# Patient Record
Sex: MALE | Race: WHITE
[De-identification: names, ages, dates, MRNs, and addresses within clinical notes are randomized per-mention and may not be internally consistent; named-entity substitution may affect disease eponyms.]

---

## 2019-07-08 ENCOUNTER — HOSPITAL ENCOUNTER (INPATIENT)
Dept: HOSPITAL 62 - ER | Age: 69
LOS: 4 days | Discharge: HOME HEALTH | DRG: 189 | End: 2019-07-12
Attending: STUDENT IN AN ORGANIZED HEALTH CARE EDUCATION/TRAINING PROGRAM | Admitting: STUDENT IN AN ORGANIZED HEALTH CARE EDUCATION/TRAINING PROGRAM
Payer: MEDICARE

## 2019-07-08 DIAGNOSIS — J44.1: ICD-10-CM

## 2019-07-08 DIAGNOSIS — J96.02: ICD-10-CM

## 2019-07-08 DIAGNOSIS — Z79.899: ICD-10-CM

## 2019-07-08 DIAGNOSIS — I50.811: ICD-10-CM

## 2019-07-08 DIAGNOSIS — J96.01: Primary | ICD-10-CM

## 2019-07-08 DIAGNOSIS — E87.5: ICD-10-CM

## 2019-07-08 DIAGNOSIS — Z99.81: ICD-10-CM

## 2019-07-08 DIAGNOSIS — F17.210: ICD-10-CM

## 2019-07-08 LAB
ABSOLUTE LYMPHOCYTES# (MANUAL): 0.5 10^3/UL (ref 0.5–4.7)
ABSOLUTE MONOCYTES # (MANUAL): 0.7 10^3/UL (ref 0.1–1.4)
ADD MANUAL DIFF: YES
ALBUMIN SERPL-MCNC: 3.6 G/DL (ref 3.5–5)
ALP SERPL-CCNC: 122 U/L (ref 38–126)
ALT SERPL-CCNC: 42 U/L (ref 21–72)
ANION GAP SERPL CALC-SCNC: 7 MMOL/L (ref 5–19)
ANISOCYTOSIS BLD QL SMEAR: SLIGHT
APTT BLD: 32.9 SEC (ref 23.5–35.8)
ARTERIAL BLOOD FIO2: (no result)
ARTERIAL BLOOD H2CO3: 2.34 MMOL/L (ref 1.05–1.35)
ARTERIAL BLOOD HCO3: 38.3 MMOL/L (ref 20–24)
ARTERIAL BLOOD PCO2: 77.9 MMHG (ref 35–45)
ARTERIAL BLOOD PH: 7.31 (ref 7.35–7.45)
ARTERIAL BLOOD PO2: 61.6 MMHG (ref 80–100)
ARTERIAL BLOOD TOTAL CO2: 40.7 MMOL/L (ref 23–27)
AST SERPL-CCNC: 31 U/L (ref 17–59)
BASE EXCESS BLDA CALC-SCNC: 9 MMOL/L
BASOPHILS NFR BLD MANUAL: 0 % (ref 0–2)
BILIRUB DIRECT SERPL-MCNC: 0.4 MG/DL (ref 0–0.4)
BILIRUB SERPL-MCNC: 0.6 MG/DL (ref 0.2–1.3)
BUN SERPL-MCNC: 24 MG/DL (ref 7–20)
CALCIUM: 8.8 MG/DL (ref 8.4–10.2)
CHLORIDE SERPL-SCNC: 96 MMOL/L (ref 98–107)
CK MB SERPL-MCNC: 3.35 NG/ML (ref ?–4.55)
CK SERPL-CCNC: 71 U/L (ref 55–170)
CO2 SERPL-SCNC: 39 MMOL/L (ref 22–30)
EOSINOPHIL NFR BLD MANUAL: 5 % (ref 0–6)
ERYTHROCYTE [DISTWIDTH] IN BLOOD BY AUTOMATED COUNT: 14.8 % (ref 11.5–14)
GLUCOSE SERPL-MCNC: 178 MG/DL (ref 75–110)
HCT VFR BLD CALC: 43.6 % (ref 37.9–51)
HGB BLD-MCNC: 14.3 G/DL (ref 13.5–17)
INR PPP: 1.1
LIPASE SERPL-CCNC: 20 U/L (ref 23–300)
MCH RBC QN AUTO: 31.2 PG (ref 27–33.4)
MCHC RBC AUTO-ENTMCNC: 32.9 G/DL (ref 32–36)
MCV RBC AUTO: 95 FL (ref 80–97)
MONOCYTES % (MANUAL): 5 % (ref 3–13)
PLATELET # BLD: 450 10^3/UL (ref 150–450)
PLATELET CLUMP BLD QL SMEAR: PRESENT
PLATELET COMMENT: ADEQUATE
POTASSIUM SERPL-SCNC: 5.6 MMOL/L (ref 3.6–5)
PROT SERPL-MCNC: 6.6 G/DL (ref 6.3–8.2)
PROTHROMBIN TIME: 14.2 SEC (ref 11.4–15.4)
RBC # BLD AUTO: 4.6 10^6/UL (ref 4.35–5.55)
SAO2 % BLDA: 88.3 % (ref 94–98)
SEGMENTED NEUTROPHILS % (MAN): 86 % (ref 42–78)
SODIUM SERPL-SCNC: 142 MMOL/L (ref 137–145)
STOMATOCYTES BLD QL SMEAR: SLIGHT
TOTAL CELLS COUNTED BLD: 100
TROPONIN I SERPL-MCNC: 0.26 NG/ML
VARIANT LYMPHS NFR BLD MANUAL: 4 % (ref 13–45)
WBC # BLD AUTO: 13.7 10^3/UL (ref 4–10.5)
WBC TOXIC VACUOLES BLD QL SMEAR: PRESENT

## 2019-07-08 PROCEDURE — 5A09457 ASSISTANCE WITH RESPIRATORY VENTILATION, 24-96 CONSECUTIVE HOURS, CONTINUOUS POSITIVE AIRWAY PRESSURE: ICD-10-PCS

## 2019-07-08 PROCEDURE — 71045 X-RAY EXAM CHEST 1 VIEW: CPT

## 2019-07-08 PROCEDURE — 80048 BASIC METABOLIC PNL TOTAL CA: CPT

## 2019-07-08 PROCEDURE — 93010 ELECTROCARDIOGRAM REPORT: CPT

## 2019-07-08 PROCEDURE — 94660 CPAP INITIATION&MGMT: CPT

## 2019-07-08 PROCEDURE — 87040 BLOOD CULTURE FOR BACTERIA: CPT

## 2019-07-08 PROCEDURE — 85025 COMPLETE CBC W/AUTO DIFF WBC: CPT

## 2019-07-08 PROCEDURE — 93306 TTE W/DOPPLER COMPLETE: CPT

## 2019-07-08 PROCEDURE — 82550 ASSAY OF CK (CPK): CPT

## 2019-07-08 PROCEDURE — 96374 THER/PROPH/DIAG INJ IV PUSH: CPT

## 2019-07-08 PROCEDURE — 93005 ELECTROCARDIOGRAM TRACING: CPT

## 2019-07-08 PROCEDURE — 96375 TX/PRO/DX INJ NEW DRUG ADDON: CPT

## 2019-07-08 PROCEDURE — 80053 COMPREHEN METABOLIC PANEL: CPT

## 2019-07-08 PROCEDURE — 85730 THROMBOPLASTIN TIME PARTIAL: CPT

## 2019-07-08 PROCEDURE — 99285 EMERGENCY DEPT VISIT HI MDM: CPT

## 2019-07-08 PROCEDURE — 84132 ASSAY OF SERUM POTASSIUM: CPT

## 2019-07-08 PROCEDURE — 84484 ASSAY OF TROPONIN QUANT: CPT

## 2019-07-08 PROCEDURE — 83690 ASSAY OF LIPASE: CPT

## 2019-07-08 PROCEDURE — 83605 ASSAY OF LACTIC ACID: CPT

## 2019-07-08 PROCEDURE — 82803 BLOOD GASES ANY COMBINATION: CPT

## 2019-07-08 PROCEDURE — 94640 AIRWAY INHALATION TREATMENT: CPT

## 2019-07-08 PROCEDURE — 85610 PROTHROMBIN TIME: CPT

## 2019-07-08 PROCEDURE — 82553 CREATINE MB FRACTION: CPT

## 2019-07-08 PROCEDURE — G0378 HOSPITAL OBSERVATION PER HR: HCPCS

## 2019-07-08 PROCEDURE — 96376 TX/PRO/DX INJ SAME DRUG ADON: CPT

## 2019-07-08 PROCEDURE — 36415 COLL VENOUS BLD VENIPUNCTURE: CPT

## 2019-07-08 PROCEDURE — 83880 ASSAY OF NATRIURETIC PEPTIDE: CPT

## 2019-07-08 RX ADMIN — HEPARIN SODIUM SCH UNIT: 5000 INJECTION, SOLUTION INTRAVENOUS; SUBCUTANEOUS at 22:33

## 2019-07-08 RX ADMIN — Medication SCH: at 22:50

## 2019-07-08 RX ADMIN — METHYLPREDNISOLONE SODIUM SUCCINATE SCH MG: 40 INJECTION, POWDER, FOR SOLUTION INTRAMUSCULAR; INTRAVENOUS at 22:35

## 2019-07-08 RX ADMIN — IPRATROPIUM BROMIDE AND ALBUTEROL SULFATE SCH ML: 2.5; .5 SOLUTION RESPIRATORY (INHALATION) at 20:40

## 2019-07-08 NOTE — ADVANCED CARE
- Diagnosis


(1) Acute respiratory failure with hypoxia and hypercapnia


Diagnosis Current: Yes   





(2) COPD exacerbation


Diagnosis Current: Yes   





(3) CHF (congestive heart failure)


Diagnosis Current: Yes   


Resuscitation Status: Do Not Resuscitate


Discussion: 


Discussed with patient and wife on bedside.  He verbalizes he has an advanced 

directive and that he is a DNR/DNI.  He does not want any chest compressions, 

defibrillation or mechanical ventilation if the need arises.  He says that his 

wife, Sarahi Ayala is his surrogate medical decision maker.

## 2019-07-08 NOTE — ER DOCUMENT REPORT
ED Respiratory Problem





- General


Chief Complaint: Shortness Of Breath


Stated Complaint: SHORT OF BREATH


Time Seen by Provider: 07/08/19 12:11


Mode of Arrival: Wheelchair


Information source: Patient, Relative


TRAVEL OUTSIDE OF THE [place] IN LAST 30 DAYS: No





- HPI


Patient complains to provider of: COPD, Short of breath - pt. with h/o COPD 

[place] continues to smoke with onset of SOB 

last night with exacerbation this am.





- Related Data


Allergies/Adverse Reactions: 


                                        





No Known Allergies Allergy (Verified 07/08/19 12:05)


   











Past Medical History





- General


Information source: Relative





- Social History


Smoking Status: Current Every Day Smoker


Cigarette use (# per day): Yes


Chew tobacco use (# tins/day): No


Smoking Education Provided: Yes


Family History: None





Review of Systems





- Review of Systems


Constitutional: No symptoms reported


EENT: No symptoms reported


Cardiovascular: No symptoms reported


Respiratory: See HPI, Short of breath


Gastrointestinal: No symptoms reported


Musculoskeletal: No symptoms reported


Neurological/Psychological: No symptoms reported


-: Yes All other systems reviewed and negative





Physical Exam





- Vital signs


Vitals: 


                                        











Temp Pulse Resp BP


 


 98.8 F   109 H  29 H  130/79 H


 


 07/08/19 12:23  07/08/19 12:23  07/08/19 12:23  07/08/19 12:23














- General


General appearance: Alert


In distress: Moderate





- HEENT


Mucous membranes: Normal


Pharynx: Normal


Neck: Normal





- Respiratory


Respiratory status: Respiratory distress - moderate


Chest status: Nontender


Breath sounds: Decreased air movement, Wheezing - bilateral end-expiratory 

wheezes audible





- Cardiovascular


Rhythm: Regular


Heart sounds: Normal auscultation


Murmur: No





- Extremities


General upper extremity: Normal inspection, Normal strength





- Neurological


Neuro grossly intact: Yes


Cognition: Normal


Orientation: AAOx4





Course





- Re-evaluation


Re-evalutation: 





07/08/19 15:32


Pt is breathing better after duonebs, lasix and steroids -- I will call the 

hospitalist for admission.





- Vital Signs


Vital signs: 


                                        











Temp Pulse Resp BP Pulse Ox


 


 98.8 F   109 H  19   130/79 H  94 


 


 07/08/19 12:23  07/08/19 12:23  07/08/19 13:20  07/08/19 12:23  07/08/19 13:20














- Laboratory


Result Diagrams: 


                                 07/08/19 12:15





                                 07/08/19 12:15


Laboratory results interpreted by me: 


                                        











  07/08/19 07/08/19 07/08/19





  12:15 12:15 12:15


 


WBC  13.7 H  


 


RDW  14.8 H  


 


Seg Neuts % (Manual)  86 H  


 


Lymphocytes % (Manual)  4 L  


 


Abs Neuts (Manual)  11.8 H  


 


Absolute Eos (Manual)  0.7 H  


 


Carbonic Acid   


 


ABG pH   


 


ABG pCO2   


 


ABG pO2   


 


ABG HCO3   


 


ABG Total CO2   


 


ABG O2 Saturation   


 


Potassium   5.6 H 


 


Chloride   96 L 


 


Carbon Dioxide   39 H 


 


BUN   24 H 


 


Glucose   178 H 


 


NT-Pro-B Natriuret Pep    3250 H


 


Lipase   20.0 L 














  07/08/19





  12:42


 


WBC 


 


RDW 


 


Seg Neuts % (Manual) 


 


Lymphocytes % (Manual) 


 


Abs Neuts (Manual) 


 


Absolute Eos (Manual) 


 


Carbonic Acid  2.34 H


 


ABG pH  7.31 L


 


ABG pCO2  77.9 H*


 


ABG pO2  61.6 L


 


ABG HCO3  38.3 H


 


ABG Total CO2  40.7 H


 


ABG O2 Saturation  88.3 L


 


Potassium 


 


Chloride 


 


Carbon Dioxide 


 


BUN 


 


Glucose 


 


NT-Pro-B Natriuret Pep 


 


Lipase 














- Diagnostic Test


Radiology reviewed: Reports reviewed - ?CHF





- EKG Interpretation by Me


EKG shows normal: Sinus rhythm


Rate: Normal


Rhythm: NSR - nsr without acute change





Critical Care Note





- Critical Care Note


Total time excluding time spent on procedures (mins): 30





Discharge





- Discharge


Clinical Impression: 


 COPD exacerbation





CHF (congestive heart failure)


Qualifiers:


 Heart failure type: unspecified Heart failure chronicity: unspecified Qualified

Code(s): I50.9 - Heart failure, unspecified





Condition: Stable


Disposition: ADMITTED AS OBSERVATION


Admitting Provider: Ursula (Hospitalist)


Unit Admitted: [facility]

## 2019-07-08 NOTE — RADIOLOGY REPORT (SQ)
EXAM DESCRIPTION:  CHEST SINGLE VIEW



COMPLETED DATE/TIME:  7/8/2019 12:28 pm



REASON FOR STUDY:  #1 SYNCOPE



COMPARISON:  None.



EXAM PARAMETERS:  NUMBER OF VIEWS: One view.

TECHNIQUE: Single frontal radiographic view of the chest acquired.

RADIATION DOSE: NA

LIMITATIONS: None.



FINDINGS:  LUNGS AND PLEURA: Few Kerley lines are present at both lung bases, question mild fluid ove
rload or congestive failure.

No pulmonary alveolar edema or dense consolidation worrisome for pneumonia.

No pleural effusions or pneumothorax.

MEDIASTINUM AND HILAR STRUCTURES: No masses.  Contour normal.

HEART AND VASCULAR STRUCTURES: Heart normal in size.  Normal vasculature.

BONES: No acute findings.

HARDWARE: None in the chest.

OTHER: No other significant finding.



IMPRESSION:  Few Kerley lines from fluid overload or congestive failure



TECHNICAL DOCUMENTATION:  JOB ID:  [id number]

 2011 [employer]- All Rights Reserved



Reading location - IP/workstation name: [email or web address]

## 2019-07-08 NOTE — EKG REPORT
SEVERITY:- OTHERWISE NORMAL ECG -

SINUS RHYTHM

BORDERLINE LEFT AXIS DEVIATION

:

Confirmed by: Cata Cristina MD 08-Jul-2019 19:20:28

## 2019-07-08 NOTE — ER DOCUMENT REPORT
ED Medical Screen (RME)





- General


Chief Complaint: Shortness Of Breath


Stated Complaint: SHORT OF BREATH


Time Seen by Provider: 07/08/19 12:11


Mode of Arrival: Wheelchair


Information source: Patient


Notes: 





69-year-old male presents to ED for shortness of breath unable to get his 

breath.  He states his been since last night his wife states is been a lot 

longer than that.  He does smoke more than a pack a day.  Patient is very short 

of breath with sats in the 70s and 80s we did get it up to 82 on 2 L when we 

moved him to the bed 3 we did get up to 92.  I have started labs x-rays 

treatments steroids and informed Dr. Mojica the patient needed to be seen 

promptly.














I have greeted and performed a rapid initial assessment of this patient.  A 

comprehensive ED assessment and evaluation of the patient, analysis of test 

results and completion of medical decision making process will be conducted by 

an additional ED providers.








Dictation of this chart was performed using voice recognition software; 

therefore, there may be some unintended grammatical errors.


TRAVEL OUTSIDE OF THE [place] IN LAST 30 DAYS: No





- Related Data


Allergies/Adverse Reactions: 


                                        





No Known Allergies Allergy (Verified 07/08/19 12:05)

## 2019-07-08 NOTE — PDOC H&P
History of Present Illness


Admission Date/PCP: 


  07/08/19 15:52





  





Patient complains of: SOB


History of Present Illness: 


KALA VIERA is a 69 year old male who denies significant past medical history 

but does not see a regular physician who is presenting with worsening shortness 

of breath and cough.





Wife is in the bedside.  Patient is a chronic heavy smoker.  He was not 

previously diagnosed with COPD but she says that they have been suspecting COPD 

as he has been having shortness of breath for the past 8 months and has 

occasional wheezing.  He says that he has been having increasingly productive 

cough for the past week with whitish sputum.  He started developing worsening 

shortness of breath and fast 45 days.  He says that his wife checked his O2 

saturation this morning it was down to 49%.  In the ER, patient was noted to be 

hypoxic in the 70s.  He was noted to have significant bilateral wheezing.  He 

was given Solu-Medrol and breathing treatments.  His chest x-ray also showed 

pulmonary congestion.  He was also given IV Lasix in the ER.





Upon encounter, patient is saturating well on the BiPAP.  He says he is 

breathing is much better.  He does have rhonchi and wheezes in both sides.  He 

denies a prior diagnosis of COPD or CHF.








Social History


Smoking Status: Current Every Day Smoker





Family History


Family History: None


Parental Family History Reviewed: Yes - no premature CAD


Children Family History Reviewed: No


Sibling(s) Family History Reviewed.: No





Medication/Allergy


Allergies/Adverse Reactions: 


                                        





No Known Allergies Allergy (Verified 07/08/19 12:05)


   











Review of Systems


All systems: reviewed and no additional remarkable complaints except as stated -

as mentioned in HPI





Physical Exam


Vital Signs: 


                                        











Temp Pulse Resp BP Pulse Ox


 


 98.8 F   109 H  19   130/79 H  94 


 


 07/08/19 12:23  07/08/19 12:23  07/08/19 13:20  07/08/19 12:23  07/08/19 13:20








                                 Intake & Output











 07/07/19 07/08/19 07/09/19





 06:59 06:59 06:59


 


Weight   177 lb 0.499 oz











General appearance: PRESENT: no acute distress, well-developed, well-nourished


Head exam: PRESENT: atraumatic, normocephalic


Eye exam: PRESENT: conjunctiva pink, EOMI, PERRLA.  ABSENT: scleral icterus


Ear exam: PRESENT: normal external ear exam


Mouth exam: PRESENT: moist, tongue midline


Neck exam: ABSENT: carotid bruit, JVD, lymphadenopathy, thyromegaly


Respiratory exam: PRESENT: rhonchi, wheezes.  ABSENT: rales


Cardiovascular exam: PRESENT: RRR.  ABSENT: diastolic murmur, rubs, systolic 

murmur


Pulses: PRESENT: normal dorsalis pedis pul


GI/Abdominal exam: PRESENT: normal bowel sounds, soft.  ABSENT: distended, 

guarding, mass, organolmegaly, rebound, tenderness


Rectal exam: PRESENT: deferred


Neurological exam: PRESENT: alert, awake, oriented to person, oriented to place,

oriented to time, oriented to situation, CN II-XII grossly intact.  ABSENT: 

motor sensory deficit





Results


Laboratory Results: 


                                        





                                 07/08/19 12:15 





                                 07/08/19 12:15 





                                        











  07/08/19 07/08/19 07/08/19





  12:15 12:15 12:15


 


WBC  13.7 H  


 


RBC  4.60  


 


Hgb  14.3  


 


Hct  43.6  


 


MCV  95  


 


MCH  31.2  


 


MCHC  32.9  


 


RDW  14.8 H  


 


Plt Count  450  


 


Seg Neutrophils %  Not Reportable  


 


Lymphocytes %  Not Reportable  


 


Monocytes %  Not Reportable  


 


Eosinophils %  Not Reportable  


 


Basophils %  Not Reportable  


 


Absolute Neutrophils  Not Reportable  


 


Absolute Lymphocytes  Not Reportable  


 


Absolute Monocytes  Not Reportable  


 


Absolute Eosinophils  Not Reportable  


 


Absolute Basophils  Not Reportable  


 


Carbonic Acid   


 


HCO3/H2CO3 Ratio   


 


ABG pH   


 


ABG pCO2   


 


ABG pO2   


 


ABG HCO3   


 


ABG O2 Saturation   


 


ABG Base Excess   


 


FiO2   


 


Sodium   142.0 


 


Potassium   5.6 H 


 


Chloride   96 L 


 


Carbon Dioxide   39 H 


 


Anion Gap   7 


 


BUN   24 H 


 


Creatinine   0.86 


 


Est GFR ( Amer)   > 60 


 


Est GFR (Non-Af Amer)   > 60 


 


Glucose   178 H 


 


Lactic Acid    1.2


 


Calcium   8.8 


 


Total Bilirubin   0.6 


 


AST   31 


 


ALT   42 


 


Alkaline Phosphatase   122 


 


Total Protein   6.6 


 


Albumin   3.6 


 


Lipase   20.0 L 














  07/08/19





  12:42


 


WBC 


 


RBC 


 


Hgb 


 


Hct 


 


MCV 


 


MCH 


 


MCHC 


 


RDW 


 


Plt Count 


 


Seg Neutrophils % 


 


Lymphocytes % 


 


Monocytes % 


 


Eosinophils % 


 


Basophils % 


 


Absolute Neutrophils 


 


Absolute Lymphocytes 


 


Absolute Monocytes 


 


Absolute Eosinophils 


 


Absolute Basophils 


 


Carbonic Acid  2.34 H


 


HCO3/H2CO3 Ratio  16:1


 


ABG pH  7.31 L


 


ABG pCO2  77.9 H*


 


ABG pO2  61.6 L


 


ABG HCO3  38.3 H


 


ABG O2 Saturation  88.3 L


 


ABG Base Excess  9.0


 


FiO2  2L


 


Sodium 


 


Potassium 


 


Chloride 


 


Carbon Dioxide 


 


Anion Gap 


 


BUN 


 


Creatinine 


 


Est GFR ( Amer) 


 


Est GFR (Non-Af Amer) 


 


Glucose 


 


Lactic Acid 


 


Calcium 


 


Total Bilirubin 


 


AST 


 


ALT 


 


Alkaline Phosphatase 


 


Total Protein 


 


Albumin 


 


Lipase 








                                        











  07/08/19 07/08/19 07/08/19





  12:15 12:15 12:15


 


Creatine Kinase  71  


 


CK-MB (CK-2)   3.35 


 


Troponin I   0.255 


 


NT-Pro-B Natriuret Pep    3250 H











Impressions: 


                                        





Chest X-Ray  07/08/19 12:11


IMPRESSION:  Few Kerley lines from fluid overload or congestive failure


 














Assessment and Plan





- Diagnosis


(1) Acute respiratory failure with hypoxia and hypercapnia


Is this a current diagnosis for this admission?: Yes   





(2) COPD exacerbation


Is this a current diagnosis for this admission?: Yes   





(3) CHF (congestive heart failure)


Qualifiers: 


   Heart failure type: unspecified   Heart failure chronicity: unspecified   

Qualified Code(s): I50.9 - Heart failure, unspecified   


Is this a current diagnosis for this admission?: Yes   





- Time


Time Spent with patient: 25-34 minutes

## 2019-07-09 RX ADMIN — METHYLPREDNISOLONE SODIUM SUCCINATE SCH MG: 40 INJECTION, POWDER, FOR SOLUTION INTRAMUSCULAR; INTRAVENOUS at 13:16

## 2019-07-09 RX ADMIN — IPRATROPIUM BROMIDE AND ALBUTEROL SULFATE SCH ML: 2.5; .5 SOLUTION RESPIRATORY (INHALATION) at 16:35

## 2019-07-09 RX ADMIN — IPRATROPIUM BROMIDE AND ALBUTEROL SULFATE SCH ML: 2.5; .5 SOLUTION RESPIRATORY (INHALATION) at 08:35

## 2019-07-09 RX ADMIN — HEPARIN SODIUM SCH UNIT: 5000 INJECTION, SOLUTION INTRAVENOUS; SUBCUTANEOUS at 13:16

## 2019-07-09 RX ADMIN — HEPARIN SODIUM SCH UNIT: 5000 INJECTION, SOLUTION INTRAVENOUS; SUBCUTANEOUS at 21:25

## 2019-07-09 RX ADMIN — Medication SCH ML: at 05:15

## 2019-07-09 RX ADMIN — AZITHROMYCIN SCH MG: 250 TABLET, FILM COATED ORAL at 09:13

## 2019-07-09 RX ADMIN — IPRATROPIUM BROMIDE AND ALBUTEROL SULFATE SCH ML: 2.5; .5 SOLUTION RESPIRATORY (INHALATION) at 20:26

## 2019-07-09 RX ADMIN — METHYLPREDNISOLONE SODIUM SUCCINATE SCH MG: 40 INJECTION, POWDER, FOR SOLUTION INTRAMUSCULAR; INTRAVENOUS at 21:25

## 2019-07-09 RX ADMIN — HEPARIN SODIUM SCH UNIT: 5000 INJECTION, SOLUTION INTRAVENOUS; SUBCUTANEOUS at 05:15

## 2019-07-09 RX ADMIN — FUROSEMIDE SCH MG: 20 TABLET ORAL at 09:13

## 2019-07-09 RX ADMIN — IPRATROPIUM BROMIDE AND ALBUTEROL SULFATE SCH ML: 2.5; .5 SOLUTION RESPIRATORY (INHALATION) at 00:04

## 2019-07-09 RX ADMIN — Medication SCH ML: at 13:16

## 2019-07-09 RX ADMIN — Medication SCH ML: at 21:25

## 2019-07-09 RX ADMIN — METHYLPREDNISOLONE SODIUM SUCCINATE SCH MG: 40 INJECTION, POWDER, FOR SOLUTION INTRAMUSCULAR; INTRAVENOUS at 05:14

## 2019-07-09 RX ADMIN — IPRATROPIUM BROMIDE AND ALBUTEROL SULFATE SCH ML: 2.5; .5 SOLUTION RESPIRATORY (INHALATION) at 12:46

## 2019-07-09 RX ADMIN — IPRATROPIUM BROMIDE AND ALBUTEROL SULFATE SCH ML: 2.5; .5 SOLUTION RESPIRATORY (INHALATION) at 04:10

## 2019-07-09 NOTE — PDOC PROGRESS REPORT
Subjective


Progress Note for:: 07/09/19


Subjective:: 





Mr. Ayala is a very pleasant 69-year-old gentleman who presented to the ER 

yesterday with a COPD exacerbation and acute congestive heart failure unknown 

type for which we are awaiting echocardiogram at this time.  Patient was found 

to have a BNP of 3000 yesterday.  He was placed on BiPAP and started on Lasix, 

steroids, nebulizers and antibiotics.  At this time patient states his breathing

is much better he is in no apparent distress and complaining no concerns at this

time.  Patient was also hyperkalemic in the ER and I am awaiting a follow-up 

potassium level at this time.


Reason For Visit: 


COPD EXACERBATION,CHF (CONGESTIVE HEART FAILURE)








Physical Exam


Vital Signs: 


                                        











Temp Pulse Resp BP Pulse Ox


 


 97.8 F   89   20   124/59 L  95 


 


 07/09/19 12:00  07/09/19 14:00  07/09/19 12:46  07/09/19 12:00  07/09/19 12:46








                                 Intake & Output











 07/08/19 07/09/19 07/10/19





 06:59 06:59 06:59


 


Intake Total  120 240


 


Balance  120 240


 


Weight  75.8 kg 











General appearance: PRESENT: no acute distress, well-developed, well-nourished


Neck exam: ABSENT: carotid bruit, JVD, lymphadenopathy, thyromegaly


Respiratory exam: PRESENT: rales


Cardiovascular exam: PRESENT: RRR.  ABSENT: diastolic murmur, rubs, systolic 

murmur


Pulses: PRESENT: normal dorsalis pedis pul


Vascular exam: PRESENT: normal capillary refill


GI/Abdominal exam: PRESENT: normal bowel sounds, soft.  ABSENT: distended, 

guarding, mass, organolmegaly, rebound, tenderness


Neurological exam: PRESENT: alert, awake, oriented to person, oriented to place,

oriented to time, oriented to situation, CN II-XII grossly intact.  ABSENT: 

motor sensory deficit


Psychiatric exam: PRESENT: appropriate affect, normal mood.  ABSENT: homicidal 

ideation, suicidal ideation


Skin exam: PRESENT: dry, intact, warm.  ABSENT: cyanosis, rash





Results


Laboratory Results: 


                                        





                                 07/08/19 12:15 





                                 07/08/19 12:15 





                                        











  07/08/19 07/08/19 07/08/19





  12:15 12:15 12:15


 


Creatine Kinase  71  


 


CK-MB (CK-2)   3.35 


 


Troponin I   0.255 


 


NT-Pro-B Natriuret Pep    3250 H











Impressions: 


                                        





Chest X-Ray  07/08/19 12:11


IMPRESSION:  Few Kerley lines from fluid overload or congestive failure


 














Assessment and Plan





- Diagnosis


(1) Acute respiratory failure with hypoxia and hypercapnia


Is this a current diagnosis for this admission?: Yes   


Plan: 


Improved.  At this time continue BiPAP.  Submental oxygen as needed.  Will fol

low make adjustments based on patient needs.








(2) CHF (congestive heart failure)


Qualifiers: 


   Heart failure type: unspecified   Heart failure chronicity: unspecified   

Qualified Code(s): I50.9 - Heart failure, unspecified   


Is this a current diagnosis for this admission?: Yes   


Plan: 


Continue current Lasix dosing.  Repeat BMP in the a.m.  Patient in no distress 

at this time.








(3) COPD exacerbation


Is this a current diagnosis for this admission?: Yes   


Plan: 


Improved.  Continue Solu-Medrol, nebs and antibiotics at this time.  Will most 

likely switch IV antibodies to p.o. in the a.m.








(4) Hyperkalemia


Is this a current diagnosis for this admission?: Yes   


Plan: 


Repeat potassium level now.  Treat as appropriate.








- Time


Time Spent with patient: 15-24 minutes





- Inpatient Certification


Medical Necessity: Need for Nebulizer Therapy and Monitoring of Response, Risk 

of Complication if Not Cared For in Hospital

## 2019-07-10 LAB
ABSOLUTE LYMPHOCYTES# (MANUAL): 1.3 10^3/UL (ref 0.5–4.7)
ABSOLUTE MONOCYTES # (MANUAL): 1.2 10^3/UL (ref 0.1–1.4)
ADD MANUAL DIFF: YES
ANION GAP SERPL CALC-SCNC: 5 MMOL/L (ref 5–19)
BASOPHILS NFR BLD MANUAL: 0 % (ref 0–2)
BUN SERPL-MCNC: 24 MG/DL (ref 7–20)
CALCIUM: 8.3 MG/DL (ref 8.4–10.2)
CHLORIDE SERPL-SCNC: 96 MMOL/L (ref 98–107)
CO2 SERPL-SCNC: 38 MMOL/L (ref 22–30)
EOSINOPHIL NFR BLD MANUAL: 0 % (ref 0–6)
ERYTHROCYTE [DISTWIDTH] IN BLOOD BY AUTOMATED COUNT: 14.5 % (ref 11.5–14)
GLUCOSE SERPL-MCNC: 202 MG/DL (ref 75–110)
HCT VFR BLD CALC: 39.8 % (ref 37.9–51)
HGB BLD-MCNC: 13.1 G/DL (ref 13.5–17)
MCH RBC QN AUTO: 30.9 PG (ref 27–33.4)
MCHC RBC AUTO-ENTMCNC: 32.8 G/DL (ref 32–36)
MCV RBC AUTO: 94 FL (ref 80–97)
MONOCYTES % (MANUAL): 9 % (ref 3–13)
NEUTS BAND NFR BLD MANUAL: 4 % (ref 3–5)
PLATELET # BLD: 397 10^3/UL (ref 150–450)
PLATELET COMMENT: ADEQUATE
POTASSIUM SERPL-SCNC: 5.3 MMOL/L (ref 3.6–5)
RBC # BLD AUTO: 4.23 10^6/UL (ref 4.35–5.55)
RBC MORPH BLD: (no result)
SEGMENTED NEUTROPHILS % (MAN): 77 % (ref 42–78)
SODIUM SERPL-SCNC: 139.2 MMOL/L (ref 137–145)
TOTAL CELLS COUNTED BLD: 100
VARIANT LYMPHS NFR BLD MANUAL: 10 % (ref 13–45)
WBC # BLD AUTO: 12.9 10^3/UL (ref 4–10.5)

## 2019-07-10 RX ADMIN — IPRATROPIUM BROMIDE AND ALBUTEROL SULFATE SCH ML: 2.5; .5 SOLUTION RESPIRATORY (INHALATION) at 00:14

## 2019-07-10 RX ADMIN — IPRATROPIUM BROMIDE AND ALBUTEROL SULFATE SCH ML: 2.5; .5 SOLUTION RESPIRATORY (INHALATION) at 20:01

## 2019-07-10 RX ADMIN — IPRATROPIUM BROMIDE AND ALBUTEROL SULFATE SCH ML: 2.5; .5 SOLUTION RESPIRATORY (INHALATION) at 08:08

## 2019-07-10 RX ADMIN — Medication SCH ML: at 22:26

## 2019-07-10 RX ADMIN — Medication SCH ML: at 05:46

## 2019-07-10 RX ADMIN — PREDNISONE SCH MG: 20 TABLET ORAL at 09:41

## 2019-07-10 RX ADMIN — AZITHROMYCIN SCH MG: 250 TABLET, FILM COATED ORAL at 09:42

## 2019-07-10 RX ADMIN — IPRATROPIUM BROMIDE AND ALBUTEROL SULFATE SCH ML: 2.5; .5 SOLUTION RESPIRATORY (INHALATION) at 11:25

## 2019-07-10 RX ADMIN — HEPARIN SODIUM SCH UNIT: 5000 INJECTION, SOLUTION INTRAVENOUS; SUBCUTANEOUS at 22:26

## 2019-07-10 RX ADMIN — HEPARIN SODIUM SCH UNIT: 5000 INJECTION, SOLUTION INTRAVENOUS; SUBCUTANEOUS at 16:00

## 2019-07-10 RX ADMIN — METHYLPREDNISOLONE SODIUM SUCCINATE SCH MG: 40 INJECTION, POWDER, FOR SOLUTION INTRAMUSCULAR; INTRAVENOUS at 05:46

## 2019-07-10 RX ADMIN — HEPARIN SODIUM SCH UNIT: 5000 INJECTION, SOLUTION INTRAVENOUS; SUBCUTANEOUS at 05:46

## 2019-07-10 RX ADMIN — Medication SCH ML: at 16:00

## 2019-07-10 RX ADMIN — IPRATROPIUM BROMIDE AND ALBUTEROL SULFATE SCH ML: 2.5; .5 SOLUTION RESPIRATORY (INHALATION) at 04:41

## 2019-07-10 RX ADMIN — IPRATROPIUM BROMIDE AND ALBUTEROL SULFATE SCH ML: 2.5; .5 SOLUTION RESPIRATORY (INHALATION) at 16:19

## 2019-07-10 NOTE — PDOC PROGRESS REPORT
Subjective


Progress Note for:: 07/10/19


Subjective:: 


Mr. Ayala is a pleasant 69-year-old gentleman who presented to ER with acute





Reason For Visit: 


ACUTE RESPIRATORY FAILURE ,POSSIBLE COPD VS CHF








Physical Exam


Vital Signs: 


                                        











Temp Pulse Resp BP Pulse Ox


 


 97.6 F   84   16   121/68   90 L


 


 07/10/19 03:40  07/10/19 08:08  07/10/19 08:08  07/10/19 03:40  07/10/19 08:08








                                 Intake & Output











 07/09/19 07/10/19 07/11/19





 06:59 06:59 06:59


 


Intake Total 120 1787 


 


Output Total  850 


 


Balance 120 937 


 


Weight 75.8 kg 76.3 kg 














Results


Laboratory Results: 


                                        





                                 07/10/19 05:49 





                                 07/10/19 05:49 





                                        











  07/09/19 07/10/19 07/10/19





  17:39 05:49 05:49


 


WBC   12.9 H 


 


RBC   4.23 L 


 


Hgb   13.1 L 


 


Hct   39.8 


 


MCV   94 


 


MCH   30.9 


 


MCHC   32.8 


 


RDW   14.5 H 


 


Plt Count   397 


 


Seg Neutrophils %   Not Reportable 


 


Lymphocytes %   Not Reportable 


 


Monocytes %   Not Reportable 


 


Eosinophils %   Not Reportable 


 


Basophils %   Not Reportable 


 


Absolute Neutrophils   Not Reportable 


 


Absolute Lymphocytes   Not Reportable 


 


Absolute Monocytes   Not Reportable 


 


Absolute Eosinophils   Not Reportable 


 


Absolute Basophils   Not Reportable 


 


Sodium    139.2


 


Potassium  5.0   5.3 H


 


Chloride    96 L


 


Carbon Dioxide    38 H


 


Anion Gap    5


 


BUN    24 H


 


Creatinine    0.66


 


Est GFR ( Amer)    > 60


 


Est GFR (Non-Af Amer)    > 60


 


Glucose    202 H


 


Calcium    8.3 L








                                        











  07/08/19 07/08/19 07/08/19





  12:15 12:15 12:15


 


Creatine Kinase  71  


 


CK-MB (CK-2)   3.35 


 


Troponin I   0.255 


 


NT-Pro-B Natriuret Pep    3250 H














  07/10/19





  05:49


 


Creatine Kinase 


 


CK-MB (CK-2) 


 


Troponin I 


 


NT-Pro-B Natriuret Pep  926 H











Impressions: 


                                        





Chest X-Ray  07/08/19 12:11


IMPRESSION:  Few Kerley lines from fluid overload or congestive failure


 














Assessment and Plan





- Diagnosis


(1) Acute respiratory failure with hypoxia and hypercapnia


Is this a current diagnosis for this admission?: Yes   


Plan: 


Improved.  At this time continue BiPAP.  Submental oxygen as needed.  Will 

follow make adjustments based on patient needs.





7/10/2019-clinically improved continues to refuse to wear BiPAP.  Submental 

oxygen as needed.  Patient did desat this a.m. while shaving without his oxygen 

to 80%.  I suspect this most likely secondary to his acute congestive heart 

failure of unknown type at this time.  Will diurese further and reassess.








(2) CHF (congestive heart failure)


Qualifiers: 


   Heart failure type: unspecified   Heart failure chronicity: unspecified   

Qualified Code(s): I50.9 - Heart failure, unspecified   


Is this a current diagnosis for this admission?: Yes   


Plan: 


Continue current Lasix dosing.  Repeat BMP in the a.m.  Patient in no distress a

t this time.





7/10/2019-hold a.m. p.o. Lasix give Lasix 40 mill grams IV x1.  Will reassess in

the a.m.








(3) COPD exacerbation


Is this a current diagnosis for this admission?: Yes   


Plan: 


Improved.  Continue Solu-Medrol, nebs and antibiotics at this time.  Will most 

likely switch IV antibodies to p.o. in the a.m.





7/10/2019-improved.  Wheezing is much improved at this time.  Will switch IV 

Solu-Medrol to p.o. prednisone 60 mg daily at this time.








(4) Hyperkalemia


Is this a current diagnosis for this admission?: Yes   


Plan: 


Repeat potassium level now.  Treat as appropriate.





7/10/2019-yesterday repeat potassium level was 5.0.  Today potassium level 5.3. 

IV Lasix will be given as a one-time dose repeat BMP in the a.m.








- Time


Time Spent with patient: 15-24 minutes





- Inpatient Certification


Medical Necessity: Other - Continues to require O2 therapy as O2 sats drop to 

80% while shaving without oxygen.  Patient also requiring IV Lasix for diuresis.

## 2019-07-10 NOTE — PROGRESS NOTE ACKNOWLEDGEMENT
Progress Note Acknowledgement


Progess Note Acknowledgement: 


I, the undersigned member of the medical staff with appropriate privileges and 

with supervisory authority over Fly Jones, a [facility] practice allied health 

professional, acknowledge that I have reviewed the progress notes entered on 

this patient, and in my professional judgment believe that the assessment made 

and/or any care evidenced was appropriate

## 2019-07-10 NOTE — XCELERA REPORT
[address] [address]

                               Tel: [phone]

                               Fax: [phone]



                      Transthoracic Echocardiogram Report

_______________________________________________________________________________



Name: KALA VIERA

MRN: [id number]                           Age: 69 yrs

Gender: Male                              : 1950

Patient Status: Inpatient                 Patient Location: [address] [place] [address]

Account #: [id number]

Study Date: 2019 06:20 PM

Accession #: [id number]

_______________________________________________________________________________



Height: 74 in        Weight: 177 lb        BSA: 2.1 m2

_______________________________________________________________________________

Procedure: A two-dimensional transthoracic echocardiogram with color flow and

Doppler was performed. The study was technically difficult with many images

being suboptimal in quality. Study Quality: Technically suboptimal.

Reason For Study: CHF / elevated BNP



History: CHF / elevated BNP.

Ordering Physician: CHAI HUGHES



Performed By: Jerri Cordero

_______________________________________________________________________________



Interpretation Summary

The left ventricle is normal in size.

There is normal left ventricular wall thickness.

LV EF is 60%

Left ventricular systolic function is normal.

Doppler measurements suggest impaired left ventricular relaxation, which is

associated with grade I/IV or mild diastolic dysfunction

No defenite regional wall motion abnormality.

There is no thrombus.

No ASD,VSD , or PFO seen.

The right ventricle is grossly normal size.

The right atrium is normal.

Right atrium not well visualized secondary to technical limitations

The left atrial size is normal.

There is no evidence of mitral valve prolapse.

There is no vegetation seen on the mitral valve.

There is no mitral valve stenosis.

There is a trace amount of mitral regurgitation

There is no aortic valvular vegetation.

There is no aortic valve stenosis

There is no LVOT obstruction.

No aortic regurgitation is present.

There is no tricuspid stenosis.

There is a trace amount of tricuspid regurgitation

Right ventricular systolic pressure is normal.

RVSP is 18 to 23 mm of Hg , with RA mean of 5 to 10.

There is no pulmonic valvular stenosis.

There is no pulmonic valvular regurgitation.

The aortic root is not well visualized.

The inferior vena cava appeared normal and decreased > 50% with respiration

(RAP 5-10 mmHg)

There is no pericardial effusion.



MMode/2D Measurements & Calculations

RVDd: 2.7 cm        LVIDd: 4.5 cm      FS: 24.4 %         Ao root diam: 3.1 cm



IVSd: 0.90 cm       LVIDs: 3.4 cm      EDV(Teich):        Ao root area:

                    LVPWd: 0.90 cm     93.7 ml            7.5 cm2

                                       ESV(Teich):        LA dimension: 3.1 cm

                                       48.2 ml

                                       EF(Teich): 48.6 %

        _______________________________________________________________

LVLd ap4: 7.6 cm    SV(MOD-sp4):

EDV(MOD-sp4):       63.0 ml

99.0 ml

LVLs ap4: 7.0 cm

ESV(MOD-sp4):

36.0 ml

EF(MOD-sp4): 63.6 %



Doppler Measurements & Calculations

MV E max jessica:      MV P1/2t max jessica:     Ao V2 max:         LV V1 max P.7 cm/sec        98.9 cm/sec           136.8 cm/sec       4.1 mmHg



MV A max jessica:      MV P1/2t: 43.3 msec   Ao max P.5 mmHgLV V1 max:

109.5 cm/sec       MVA(P1/2t): 5.1 cm2                      101.7 cm/sec

MV E/A: 0.75       MV dec slope:

                   668.6 cm/sec2

                   MV dec time: 0.20 sec



        _______________________________________________________________

PA V2 max:         TR max jessica:           MV P1/2t-pr_phl:

117.3 cm/sec       179.0 cm/sec          43.3 msec

PA max P.5 mmHgTR max P.8 mmHg



Left Ventricle

The left ventricle is normal in size. There is normal left ventricular wall

thickness. LV EF is 60%. Left ventricular systolic function is normal. Doppler

measurements suggest impaired left ventricular relaxation, which is associated

with grade I/IV or mild diastolic dysfunction. No defenite regional wall

motion abnormality. There is no thrombus. No ASD,VSD , or PFO seen.



Right Ventricle

The right ventricle is grossly normal size. The right ventricle is not well

visualized secondary to technical limitations.





Atria

The right atrium is normal. Right atrium not well visualized secondary to

technical limitations. The left atrial size is normal.



Mitral Valve

There is no evidence of mitral valve prolapse. There is no vegetation seen on

the mitral valve. There is no mitral valve stenosis. There is a trace amount

of mitral regurgitation.



Aortic Valve

There is no aortic valvular vegetation. There is no aortic valve stenosis.

There is no LVOT obstruction. No aortic regurgitation is present.



Tricuspid Valve

There is no tricuspid stenosis. There is a trace amount of tricuspid

regurgitation. Right ventricular systolic pressure is normal. RVSP is 18 to 23

mm of Hg , with RA mean of 5 to 10.





Pulmonic Valve

There is no pulmonic valvular stenosis. There is no pulmonic valvular

regurgitation.



Great Vessels

The aortic root is not well visualized. The inferior vena cava appeared normal

and decreased > 50% with respiration (RAP 5-10 mmHg).



Effusions

There is no pericardial effusion.





_______________________________________________________________________________

_______________________________________________________________________________

Electronically signed by:      Cata Cristina      on 07/10/2019 10:36 PM



CC: CHAI HUGHES

>

Cata Cristina

## 2019-07-11 LAB
ANION GAP SERPL CALC-SCNC: 2 MMOL/L (ref 5–19)
BUN SERPL-MCNC: 26 MG/DL (ref 7–20)
CALCIUM: 8.5 MG/DL (ref 8.4–10.2)
CHLORIDE SERPL-SCNC: 96 MMOL/L (ref 98–107)
CO2 SERPL-SCNC: 43 MMOL/L (ref 22–30)
GLUCOSE SERPL-MCNC: 117 MG/DL (ref 75–110)
POTASSIUM SERPL-SCNC: 5 MMOL/L (ref 3.6–5)
SODIUM SERPL-SCNC: 141.2 MMOL/L (ref 137–145)

## 2019-07-11 RX ADMIN — IPRATROPIUM BROMIDE AND ALBUTEROL SULFATE SCH ML: 2.5; .5 SOLUTION RESPIRATORY (INHALATION) at 11:12

## 2019-07-11 RX ADMIN — IPRATROPIUM BROMIDE AND ALBUTEROL SULFATE SCH ML: 2.5; .5 SOLUTION RESPIRATORY (INHALATION) at 20:16

## 2019-07-11 RX ADMIN — PREDNISONE SCH MG: 20 TABLET ORAL at 09:30

## 2019-07-11 RX ADMIN — AZITHROMYCIN SCH MG: 250 TABLET, FILM COATED ORAL at 09:31

## 2019-07-11 RX ADMIN — IPRATROPIUM BROMIDE AND ALBUTEROL SULFATE SCH ML: 2.5; .5 SOLUTION RESPIRATORY (INHALATION) at 07:21

## 2019-07-11 RX ADMIN — IPRATROPIUM BROMIDE AND ALBUTEROL SULFATE SCH ML: 2.5; .5 SOLUTION RESPIRATORY (INHALATION) at 00:23

## 2019-07-11 RX ADMIN — IPRATROPIUM BROMIDE AND ALBUTEROL SULFATE SCH ML: 2.5; .5 SOLUTION RESPIRATORY (INHALATION) at 04:11

## 2019-07-11 RX ADMIN — Medication SCH ML: at 15:23

## 2019-07-11 RX ADMIN — HEPARIN SODIUM SCH UNIT: 5000 INJECTION, SOLUTION INTRAVENOUS; SUBCUTANEOUS at 15:22

## 2019-07-11 RX ADMIN — Medication SCH ML: at 05:26

## 2019-07-11 RX ADMIN — IPRATROPIUM BROMIDE AND ALBUTEROL SULFATE SCH ML: 2.5; .5 SOLUTION RESPIRATORY (INHALATION) at 16:51

## 2019-07-11 RX ADMIN — Medication SCH ML: at 21:05

## 2019-07-11 RX ADMIN — FUROSEMIDE SCH: 20 TABLET ORAL at 17:29

## 2019-07-11 RX ADMIN — HEPARIN SODIUM SCH UNIT: 5000 INJECTION, SOLUTION INTRAVENOUS; SUBCUTANEOUS at 05:24

## 2019-07-11 RX ADMIN — HEPARIN SODIUM SCH UNIT: 5000 INJECTION, SOLUTION INTRAVENOUS; SUBCUTANEOUS at 21:03

## 2019-07-11 NOTE — PDOC PROGRESS REPORT
Subjective


Progress Note for:: 07/11/19


Subjective:: 


7/11/2019-Mr. Jamie castañeda 69-year-old gentleman presented to the ER with COPD

exacerbation and acute right-sided congestive heart failure.  Patient is been 

treated with IV Lasix, steroids, antibiotics and nebulizers.  Patient is sitting

on side of bed at this time eating breakfast in no acute distress.  We did 

perform a 6-minute walk with patient sats dropping down into the high 70s.





Reason For Visit: 


ACUTE RESPIRATORY FAILURE ,POSSIBLE COPD VS CHF








Physical Exam


Vital Signs: 


                                        











Temp Pulse Resp BP Pulse Ox


 


 97.5 F   82   18   126/82 H  99 


 


 07/11/19 07:35  07/11/19 07:35  07/11/19 07:35  07/11/19 07:35  07/11/19 07:35








                                 Intake & Output











 07/10/19 07/11/19 07/12/19





 06:59 06:59 06:59


 


Intake Total 1787 2040 


 


Output Total 850 4775 


 


Balance 937 -2735 


 


Weight 76.3 kg 79 kg 











General appearance: PRESENT: no acute distress, well-developed, well-nourished


Neck exam: ABSENT: carotid bruit, JVD, lymphadenopathy, thyromegaly


Respiratory exam: PRESENT: accessory muscle use, crackles


Cardiovascular exam: PRESENT: RRR.  ABSENT: diastolic murmur, rubs, systolic 

murmur


Pulses: PRESENT: normal dorsalis pedis pul


GI/Abdominal exam: PRESENT: normal bowel sounds, soft.  ABSENT: distended, 

guarding, mass, organolmegaly, rebound, tenderness


Extremities exam: PRESENT: full ROM.  ABSENT: calf tenderness, clubbing, pedal 

edema


Neurological exam: PRESENT: alert, awake, oriented to person, oriented to place,

oriented to time, oriented to situation, CN II-XII grossly intact.  ABSENT: 

motor sensory deficit


Psychiatric exam: PRESENT: appropriate affect, normal mood.  ABSENT: homicidal 

ideation, suicidal ideation


Skin exam: PRESENT: dry, intact, warm.  ABSENT: cyanosis, rash





Results


Laboratory Results: 


                                        





                                 07/10/19 05:49 





                                 07/11/19 06:12 





                                        











  07/11/19





  06:12


 


Sodium  141.2


 


Potassium  5.0


 


Chloride  96 L


 


Carbon Dioxide  43 H*


 


Anion Gap  2 L


 


BUN  26 H


 


Creatinine  0.76


 


Est GFR ( Amer)  > 60


 


Est GFR (Non-Af Amer)  > 60


 


Glucose  117 H


 


Calcium  8.5








                                        











  07/08/19 07/08/19 07/08/19





  12:15 12:15 12:15


 


Creatine Kinase  71  


 


CK-MB (CK-2)   3.35 


 


Troponin I   0.255 


 


NT-Pro-B Natriuret Pep    3250 H














  07/10/19





  05:49


 


Creatine Kinase 


 


CK-MB (CK-2) 


 


Troponin I 


 


NT-Pro-B Natriuret Pep  926 H











Impressions: 


                                        





Chest X-Ray  07/08/19 12:11


IMPRESSION:  Few Kerley lines from fluid overload or congestive failure


 














Assessment and Plan





- Diagnosis


(1) Acute respiratory failure with hypoxia and hypercapnia


Is this a current diagnosis for this admission?: Yes   


Plan: 


Improved.  At this time continue BiPAP.  Submental oxygen as needed.  Will 

follow make adjustments based on patient needs.





7/10/2019-clinically improved continues to refuse to wear BiPAP.  Submental o

xygen as needed.  Patient did desat this a.m. while shaving without his oxygen 

to 80%.  I suspect this most likely secondary to his acute congestive heart 

failure of unknown type at this time.  Will diurese further and reassess.





711 2019-6-minute walk showed desaturations down into the high 70s.  Patient did

bounce back relatively quickly with oxygen reapplied.  Patient did wear BiPAP 

some last night but not all night long.  I suspect most of this is from acute 

congestive heart failure.  Patient does sound better than he did yesterday I 

will read dose him with 40 mg IV Lasix today for further diuresis and continue 

other treatment for his COPD exacerbation.








(2) CHF (congestive heart failure)


Qualifiers: 


   Heart failure type: right-sided   Heart failure chronicity: acute   Qualified

Code(s): I50.811 - Acute right heart failure   


Is this a current diagnosis for this admission?: Yes   


Plan: 


Continue current Lasix dosing.  Repeat BMP in the a.m.  Patient in no distress 

at this time.





7/10/2019-hold a.m. p.o. Lasix give Lasix 40 mill grams IV x1.  Will reassess in

the a.m.





7/11/2019-improved.  Patient still dropping his oxygen saturations while 

mobilizing.  I will repeat Lasix 40 mg IV x1 this morning as he has had no bump 

in his creatinine at this time.  I will reassess him in a.m.








(3) COPD exacerbation


Is this a current diagnosis for this admission?: Yes   


Plan: 


Improved.  Continue Solu-Medrol, nebs and antibiotics at this time.  Will most 

likely switch IV antibodies to p.o. in the a.m.





7/10/2019-improved.  Wheezing is much improved at this time.  Will switch IV 

Solu-Medrol to p.o. prednisone 60 mg daily at this time.





7/11/2019-wheezing continues to improve although I am going to continue 

prednisone 60 milligrams p.o. daily at this time nebulizers.  Suspect patient 

will need these when he is discharged home also may require home oxygen with 

follow-up with pulmonology and primary care practitioner.








(4) Hyperkalemia


Is this a current diagnosis for this admission?: Yes   


Plan: 


Repeat potassium level now.  Treat as appropriate.





7/10/2019-yesterday repeat potassium level was 5.0.  Today potassium level 5.3. 

IV Lasix will be given as a one-time dose repeat BMP in the a.m.





7/11/2019-potassium today 5.0.  Repeating Lasix IV.  Repeat BMP in a.m.








- Time


Time Spent with patient: 15-24 minutes





- Inpatient Certification


Medical Necessity: Other - Patient continues to desaturate while ambulating.  

Requiring IV Lasix for diuresis and BiPAP at night.  Constant monitoring for any

respiratory compromise.

## 2019-07-12 VITALS — DIASTOLIC BLOOD PRESSURE: 79 MMHG | SYSTOLIC BLOOD PRESSURE: 130 MMHG

## 2019-07-12 LAB
ANION GAP SERPL CALC-SCNC: 5 MMOL/L (ref 5–19)
BUN SERPL-MCNC: 24 MG/DL (ref 7–20)
CALCIUM: 8.8 MG/DL (ref 8.4–10.2)
CHLORIDE SERPL-SCNC: 94 MMOL/L (ref 98–107)
CO2 SERPL-SCNC: 40 MMOL/L (ref 22–30)
GLUCOSE SERPL-MCNC: 133 MG/DL (ref 75–110)
POTASSIUM SERPL-SCNC: 5 MMOL/L (ref 3.6–5)
SODIUM SERPL-SCNC: 139.1 MMOL/L (ref 137–145)

## 2019-07-12 RX ADMIN — AZITHROMYCIN SCH MG: 250 TABLET, FILM COATED ORAL at 09:25

## 2019-07-12 RX ADMIN — HEPARIN SODIUM SCH UNIT: 5000 INJECTION, SOLUTION INTRAVENOUS; SUBCUTANEOUS at 06:09

## 2019-07-12 RX ADMIN — FUROSEMIDE SCH MG: 20 TABLET ORAL at 09:25

## 2019-07-12 RX ADMIN — Medication SCH ML: at 06:09

## 2019-07-12 RX ADMIN — IPRATROPIUM BROMIDE AND ALBUTEROL SULFATE SCH ML: 2.5; .5 SOLUTION RESPIRATORY (INHALATION) at 07:32

## 2019-07-12 RX ADMIN — IPRATROPIUM BROMIDE AND ALBUTEROL SULFATE SCH ML: 2.5; .5 SOLUTION RESPIRATORY (INHALATION) at 12:39

## 2019-07-12 RX ADMIN — IPRATROPIUM BROMIDE AND ALBUTEROL SULFATE SCH ML: 2.5; .5 SOLUTION RESPIRATORY (INHALATION) at 04:27

## 2019-07-12 RX ADMIN — IPRATROPIUM BROMIDE AND ALBUTEROL SULFATE SCH ML: 2.5; .5 SOLUTION RESPIRATORY (INHALATION) at 00:21

## 2019-07-12 RX ADMIN — PREDNISONE SCH MG: 20 TABLET ORAL at 09:24

## 2019-07-12 NOTE — PDOC DISCHARGE SUMMARY
General





- Admit/Disc Date/PCP


Admission Date/Primary Care Provider: 


  07/08/19 15:52





  





Discharge Date: 07/12/19





- Discharge Diagnosis


(1) Acute respiratory failure with hypoxia and hypercapnia


Is this a current diagnosis for this admission?: Yes   





(2) CHF (congestive heart failure)


Is this a current diagnosis for this admission?: Yes   





(3) COPD exacerbation


Is this a current diagnosis for this admission?: Yes   





(4) Hyperkalemia


Is this a current diagnosis for this admission?: Yes   





- Additional Information


Resuscitation Status: Do Not Resuscitate


Discharge Diet: Cardiac


Discharge Activity: Activity As Tolerated, Balance Activity w/Rest, Weigh Daily


Prescriptions: 


Albuterol Sulfate [Albuterol Sulfate Hfa] 8.5 gm IH Q4HP PRN 30 Days #1 

hfa.aer.[email or web address]


 PRN Reason: 


Furosemide [Lasix 20 mg Tablet] 20 mg PO DAILY #30 tablet


Ipratropium/Albuterol Sulfate [Duoneb 3 ml Ampul] 3 ml NEB RTQ4 #90 vial.neb


Prednisone [Deltasone 20 mg Tablet] 10 mg PO DAILY 6 Days #21 tablet


Home Medications: 








Aspirin [Roberto Chewable Aspirin] 1 tab PO PRN PRN 07/09/19 


Albuterol Sulfate [Albuterol Sulfate Hfa] 8.5 gm IH Q4HP PRN 30 Days #1 

hfa.aer.ad 07/12/19 


Furosemide [Lasix 20 mg Tablet] 20 mg PO DAILY #30 tablet 07/12/19 


Ipratropium/Albuterol Sulfate [Duoneb 3 ml Ampul] 3 ml NEB RTQ4 #90 vial.neb 

07/12/19 


Prednisone [Deltasone 20 mg Tablet] 10 mg PO DAILY 6 Days #21 tablet 07/12/19 











History of Present Illness


Patient complains of: None


History of Present Illness: 


KALA VIERA is a 69 year old male who presented to the ER with shortness of 

breath.  Patient denied any medical history at that time although he does have a

history of chronic heavy smoking.  Patient was found to have pulmonary 

congestion on chest x-ray as well as a COPD exacerbation.  Patient was diuresed 

throughout his stay and improved sufficiently with his pulmonary edema.  Patient

also showed improvement with his COPD with use of steroids and nebulizer 

treatments.  Patient still desats with activity but does well with 2 L nasal 

cannula.  At this time patient is improved sufficiently return home he will 

follow-up with Dr. Hall and we will have him set up to see  pulmonology.  I

will send patient home on duo nebs q. every 12, albuterol puffer 2 puffs every 4

hours as needed, prednisone Dosepak, Coreg 3.25 mill grams p.o. twice daily, 

lisinopril 2.5 mg  tablet 1/2 tablet daily and a baby aspirin daily.  Patient 

has been advised to return back to the ER if he had further complaints or 

exacerbations.








Hospital Course


Hospital Course: 





KALA VIERA is a 69 year old male who presented to the ER with shortness of 

breath.  Patient denied any medical history at that time although he does have a

history of chronic heavy smoking.  Patient was found to have pulmonary 

congestion on chest x-ray as well as a COPD exacerbation.  Patient was diuresed 

throughout his stay and improved sufficiently with his pulmonary edema.  Patient

also showed improvement with his COPD with use of steroids and nebulizer 

treatments.  Patient still desats with activity but does well with 2 L nasal 

cannula.  At this time patient is improved sufficiently return home he will 

follow-up with Dr. Hall and we will have him set up to see  pulmonology.  I

will send patient home on duo nebs q. every 12, albuterol puffer 2 puffs every 4

hours as needed, prednisone Dosepak, Coreg 3.25 mill grams p.o. twice daily, 

lisinopril 2.5 mg  tablet 1/2 tablet daily and a baby aspirin daily.  Patient 

has been advised to return back to the ER if he had further complaints or 

exacerbations.  Patient requires home O2 for chronic COPD.





Physical Exam


Vital Signs: 


                                        











Temp Pulse Resp BP Pulse Ox


 


 98.0 F   72   18   120/66   96 


 


 07/12/19 07:31  07/12/19 07:32  07/12/19 07:32  07/12/19 07:31  07/12/19 07:32








                                 Intake & Output











 07/11/19 07/12/19 07/13/19





 06:59 06:59 06:59


 


Intake Total 7210 360 


 


Output Total 6865 992 


 


Balance -6298 -390 


 


Weight 79 kg 78 kg 











General appearance: PRESENT: no acute distress, well-developed, well-nourished


Head exam: PRESENT: atraumatic, normocephalic


Eye exam: PRESENT: conjunctiva pink, EOMI, PERRLA.  ABSENT: scleral icterus


Ear exam: PRESENT: normal external ear exam


Mouth exam: PRESENT: moist, tongue midline


Teeth exam: PRESENT: poor dentation


Neck exam: ABSENT: carotid bruit, JVD, lymphadenopathy, thyromegaly


Respiratory exam: PRESENT: wheezes.  ABSENT: rales, rhonchi


Cardiovascular exam: PRESENT: RRR.  ABSENT: diastolic murmur, rubs, systolic 

murmur


Pulses: PRESENT: normal dorsalis pedis pul


Vascular exam: PRESENT: normal capillary refill


GI/Abdominal exam: PRESENT: normal bowel sounds, soft.  ABSENT: distended, 

guarding, mass, organolmegaly, rebound, tenderness


Rectal exam: PRESENT: deferred


Extremities exam: PRESENT: full ROM.  ABSENT: calf tenderness, clubbing, pedal 

edema


Neurological exam: PRESENT: alert, awake, oriented to person, oriented to place,

oriented to time, oriented to situation, CN II-XII grossly intact.  ABSENT: 

motor sensory deficit


Psychiatric exam: PRESENT: appropriate affect, normal mood.  ABSENT: homicidal 

ideation, suicidal ideation


Skin exam: PRESENT: dry, intact, warm.  ABSENT: cyanosis, rash





Results


Laboratory Results: 


                                        





                                 07/10/19 05:49 





                                 07/12/19 05:51 





                                        











  07/12/19





  05:51


 


Sodium  139.1


 


Potassium  5.0


 


Chloride  94 L


 


Carbon Dioxide  40 H*


 


Anion Gap  5


 


BUN  24 H


 


Creatinine  0.75


 


Est GFR ( Amer)  > 60


 


Est GFR (Non-Af Amer)  > 60


 


Glucose  133 H


 


Calcium  8.8








                                        











  07/08/19 07/08/19 07/08/19





  12:15 12:15 12:15


 


Creatine Kinase  71  


 


CK-MB (CK-2)   3.35 


 


Troponin I   0.255 


 


NT-Pro-B Natriuret Pep    3250 H














  07/10/19





  05:49


 


Creatine Kinase 


 


CK-MB (CK-2) 


 


Troponin I 


 


NT-Pro-B Natriuret Pep  926 H











Impressions: 


                                        





Chest X-Ray  07/08/19 12:11


IMPRESSION:  Few Kerley lines from fluid overload or congestive failure


 














Qualifiers





- *


PATIENT BEING DISCHARGED WITH ANY OF THE FOLLOWING DIAGNOSIS: No





Acute Heart Failure





- **


Is this a Heart Failure Patient?: Yes


Documentation of LVEF assessment?: Yes


LVEF < 40%?: No- if no continue to question #3


3. Anticoagulant therapy for permanect/persistent/paraoxysmal Afib or Aflutter: 

N/A


Follow-up Appointment scheduled within 7 days?: Yes





Plan


Discharge Plan: 





1 follow-up with Dr. Hall


2 follow-up with pulmonology

## 2019-07-24 ENCOUNTER — HOSPITAL ENCOUNTER (EMERGENCY)
Dept: HOSPITAL 62 - ER | Age: 69
Discharge: HOME | End: 2019-07-24
Payer: MEDICARE

## 2019-07-24 VITALS — SYSTOLIC BLOOD PRESSURE: 105 MMHG | DIASTOLIC BLOOD PRESSURE: 74 MMHG

## 2019-07-24 DIAGNOSIS — Z87.891: ICD-10-CM

## 2019-07-24 DIAGNOSIS — J44.9: ICD-10-CM

## 2019-07-24 DIAGNOSIS — R31.9: ICD-10-CM

## 2019-07-24 DIAGNOSIS — R33.9: Primary | ICD-10-CM

## 2019-07-24 LAB
ADD MANUAL DIFF: NO
ANION GAP SERPL CALC-SCNC: 6 MMOL/L (ref 5–19)
BASOPHILS # BLD AUTO: 0.1 10^3/UL (ref 0–0.2)
BASOPHILS NFR BLD AUTO: 0.7 % (ref 0–2)
BUN SERPL-MCNC: 19 MG/DL (ref 7–20)
CALCIUM: 9.2 MG/DL (ref 8.4–10.2)
CHLORIDE SERPL-SCNC: 104 MMOL/L (ref 98–107)
CO2 SERPL-SCNC: 29 MMOL/L (ref 22–30)
EOSINOPHIL # BLD AUTO: 0.1 10^3/UL (ref 0–0.6)
EOSINOPHIL NFR BLD AUTO: 0.8 % (ref 0–6)
ERYTHROCYTE [DISTWIDTH] IN BLOOD BY AUTOMATED COUNT: 15.3 % (ref 11.5–14)
GLUCOSE SERPL-MCNC: 145 MG/DL (ref 75–110)
HCT VFR BLD CALC: 42 % (ref 37.9–51)
HGB BLD-MCNC: 14 G/DL (ref 13.5–17)
LYMPHOCYTES # BLD AUTO: 0.7 10^3/UL (ref 0.5–4.7)
LYMPHOCYTES NFR BLD AUTO: 9.6 % (ref 13–45)
MCH RBC QN AUTO: 30.8 PG (ref 27–33.4)
MCHC RBC AUTO-ENTMCNC: 33.3 G/DL (ref 32–36)
MCV RBC AUTO: 93 FL (ref 80–97)
MONOCYTES # BLD AUTO: 0.6 10^3/UL (ref 0.1–1.4)
MONOCYTES NFR BLD AUTO: 8.4 % (ref 3–13)
NEUTROPHILS # BLD AUTO: 6 10^3/UL (ref 1.7–8.2)
NEUTS SEG NFR BLD AUTO: 80.5 % (ref 42–78)
PLATELET # BLD: 228 10^3/UL (ref 150–450)
POTASSIUM SERPL-SCNC: 4.6 MMOL/L (ref 3.6–5)
RBC # BLD AUTO: 4.53 10^6/UL (ref 4.35–5.55)
TOTAL CELLS COUNTED % (AUTO): 100 %
WBC # BLD AUTO: 7.5 10^3/UL (ref 4–10.5)

## 2019-07-24 PROCEDURE — 36415 COLL VENOUS BLD VENIPUNCTURE: CPT

## 2019-07-24 PROCEDURE — 96365 THER/PROPH/DIAG IV INF INIT: CPT

## 2019-07-24 PROCEDURE — 80048 BASIC METABOLIC PNL TOTAL CA: CPT

## 2019-07-24 PROCEDURE — 99283 EMERGENCY DEPT VISIT LOW MDM: CPT

## 2019-07-24 PROCEDURE — 87086 URINE CULTURE/COLONY COUNT: CPT

## 2019-07-24 PROCEDURE — C1758 CATHETER, URETERAL: HCPCS

## 2019-07-24 PROCEDURE — 85025 COMPLETE CBC W/AUTO DIFF WBC: CPT

## 2019-07-24 PROCEDURE — 96375 TX/PRO/DX INJ NEW DRUG ADDON: CPT

## 2019-07-24 NOTE — ER DOCUMENT REPORT
ED Medical Screen (RME)





- General


Chief Complaint: Urinary Retention


Stated Complaint: URINARY ISSUE


Time Seen by Provider: 07/24/19 11:36


Primary Care Provider: 


VANDANA GARCIA MD [Primary Care Provider] - Follow up as needed


Mode of Arrival: Wheelchair


Information source: Patient


Notes: 





69-year-old male presents to ED for urinary retention.  He states he had a 

catheter put in last week with a leg bag.  He states he emptied his leg bag at 5

AM and has had no urine since then.  He has not a lot of pain due to the 

retention of the urine.  Patient is alert and oriented but is in pain.  He 

states he has a history of COPD and urinary retention.  He has a urology 

appointment tomorrow.











I have greeted and performed a rapid initial assessment of this patient.  A 

comprehensive ED assessment and evaluation of the patient, analysis of test 

results and completion of medical decision making process will be conducted by 

an additional ED providers.











Dictation of this chart was performed using voice recognition software; 

therefore, there may be some unintended grammatical errors.


TRAVEL OUTSIDE OF THE [place] IN LAST 30 DAYS: No





- Related Data


Allergies/Adverse Reactions: 


                                        





No Known Allergies Allergy (Verified 07/24/19 11:29)


   











Past Medical History


Pulmonary Medical History: Reports: Hx COPD


Renal/ Medical History: Denies: Hx Peritoneal Dialysis





Physical Exam





- Vital signs


Vitals: 





                                        











Temp Pulse Resp BP Pulse Ox


 


 98.1 F   102 H  28 H  154/103 H  96 


 


 07/24/19 11:31  07/24/19 11:31  07/24/19 11:31  07/24/19 11:31  07/24/19 11:31














Course





- Vital Signs


Vital signs: 





                                        











Temp Pulse Resp BP Pulse Ox


 


 98.1 F   102 H  28 H  154/103 H  96 


 


 07/24/19 11:31  07/24/19 11:31  07/24/19 11:31  07/24/19 11:31  07/24/19 11:31














Doctor's Discharge





- Discharge


Referrals: 


VANDANA GARCIA MD [Primary Care Provider] - Follow up as needed

## 2019-07-24 NOTE — ER DOCUMENT REPORT
ED GI/





- General


Chief Complaint: Urinary Retention


Stated Complaint: URINARY ISSUE


Time Seen by Provider: 07/24/19 11:36


Primary Care Provider: 


VANDANA GARCIA MD [Primary Care Provider] - Follow up as needed


Mode of Arrival: Wheelchair


Information source: Patient


Notes: 





HPI:  69-year-old male with urinary retention starting Monday.  No history of 

prostatic hypertrophy.  A Marcial was placed on Monday.  Has urology appointment 

tomorrow.  Comes in today because he had some urinary retention.  He also had 

some blood clots.  No fevers or vomiting.  Some suprapubic abdominal discomfort.







ROS:  See HPI


All other review of systems reviewed and otherwise negative





Reviewed vital signs and nursing note as charted by RN.





PHYSICAL EXAM: 





CONSTITUTIONAL: Alert and oriented and responds appropriately to questions. 

Well-appearing; well-nourished





HEAD: Normocephalic; atraumatic





EYES: Conjunctivae is not pale





CARD: Regular rate and rhythm; no murmurs; symmetric distal pulses





RESP: Normal chest excursion without splinting or tachypnea; breath sounds clear

and equal bilaterally





ABD/GI: Normal bowel sounds; non-distended; soft, nontender to deep palpation 

currently of all 4 quadrants of the abdomen 





GI/: Patient has no penile Or scrotal testicular pain or swelling.  No 

suprapubic tenderness.  Urine is blood-tinged at this time





BACK: The back appears normal and is non-tender to palpation





EXT: Normal ROM in all joints; non-tender to palpation; no edema





SKIN: No acute lesions noted





NEURO: CN 2-12 intact; 5/5 bilateral upper and lower extremity strength with 

sensation intact to light touch





PSYCH: The patient's mood and manner are appropriate. Grooming and personal 

hygiene are appropriate.


TRAVEL OUTSIDE OF THE [place] IN LAST 30 DAYS: No





- Related Data


Allergies/Adverse Reactions: 


                                        





No Known Allergies Allergy (Verified 07/24/19 11:29)


   











Past Medical History





- General


Information source: Patient





- Social History


Smoking Status: Former Smoker


Chew tobacco use (# tins/day): No


Frequency of alcohol use: None


Drug Abuse: None


Family History: None


Patient has suicidal ideation: No


Patient has homicidal ideation: No


Pulmonary Medical History: Reports: Hx COPD


Renal/ Medical History: Denies: Hx Peritoneal Dialysis





Physical Exam





- Vital signs


Vitals: 


                                        











Temp Pulse Resp BP Pulse Ox


 


 98.1 F   102 H  28 H  154/103 H  96 


 


 07/24/19 11:31  07/24/19 11:31  07/24/19 11:31  07/24/19 11:31  07/24/19 11:31














Course





- Re-evaluation


Re-evalutation: 





07/24/19 13:09


Given the history and physical examination, the patient's Marcial catheter was 

irrigated.  The clot is dislodged and the patient has much resolution of his 

pain.  We will order CBC and chemistry to assess the patient's hemoglobin level 

and creatinine levels.  We will also continuously irrigate the Marcial to make 

sure it clears.





07/24/19 14:36


Hemoglobin and creatinine as recorded.





07/24/19 16:04


Three-way oral a three-way irrigating Marcial catheter was attempted to be placed 

and was unsuccessful on multiple attempts.  Replacing the 14 French coud was 

successful after the third attempt.  They are attempting to hand irrigate.





07/24/19 17:15


With hand irrigation, the  urine is starting to clear.  Patient has absolutely 

no pain.  Patient has an appointment with urologist tomorrow.  The caretaker is 

at bedside and we are teaching some very gentle irrigation to help dislodge any 

clots.  Strict return precautions have been explained.





07/24/19 17:51


I spoke directly to Dr. Jackson the urologist.  He is asked that we send a urine 

culture and provide a gram of Rocephin.  He will see the patient in the office. 

He does not request an ultrasound.  This has been performed.





- Vital Signs


Vital signs: 


                                        











Temp Pulse Resp BP Pulse Ox


 


 98.1 F   102 H  28 H  154/103 H  96 


 


 07/24/19 11:31  07/24/19 11:31  07/24/19 11:31  07/24/19 11:31  07/24/19 11:31














- Laboratory


Result Diagrams: 


                                 07/24/19 13:20





                                 07/24/19 13:20


Laboratory results interpreted by me: 


                                        











  07/24/19 07/24/19





  13:20 13:20


 


RDW  15.3 H 


 


Seg Neutrophils %  80.5 H 


 


Lymphocytes %  9.6 L 


 


Glucose   145 H














Discharge





- Discharge


Clinical Impression: 


 Urinary retention





Hematuria


Qualifiers:


 Hematuria type: unspecified type Qualified Code(s): R31.9 - Hematuria, 

unspecified





Condition: Good


Disposition: HOME, SELF-CARE


Additional Instructions: 


Come back immediately for any pain, fever, vomiting, decreased urination, 

increased clots, or any other acute problems.  Please follow-up with the 

appointment tomorrow with the urologist as scheduled.


Referrals: 


VANDANA GARCIA MD [Primary Care Provider] - Follow up as needed

## 2019-08-14 ENCOUNTER — HOSPITAL ENCOUNTER (OUTPATIENT)
Dept: HOSPITAL 62 - ER | Age: 69
Setting detail: OBSERVATION
LOS: 3 days | Discharge: HOME | End: 2019-08-17
Attending: INTERNAL MEDICINE | Admitting: INTERNAL MEDICINE
Payer: MEDICARE

## 2019-08-14 DIAGNOSIS — I71.4: ICD-10-CM
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
## 2019-08-14 DIAGNOSIS — J44.9: ICD-10-CM

## 2019-08-14 DIAGNOSIS — S32.591A: ICD-10-CM
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
## 2019-08-14 DIAGNOSIS — F17.200: ICD-10-CM

## 2019-08-14 DIAGNOSIS — D41.4: ICD-10-CM

## 2019-08-14 DIAGNOSIS — M54.9: ICD-10-CM

## 2019-08-14 DIAGNOSIS — S32.401A: Primary | ICD-10-CM

## 2019-08-14 DIAGNOSIS — Z79.899: ICD-10-CM

## 2019-08-14 LAB
ADD MANUAL DIFF: NO
ALBUMIN SERPL-MCNC: 4 G/DL (ref 3.5–5)
ALP SERPL-CCNC: 75 U/L (ref 38–126)
AMYLASE SERPL-CCNC: 53 U/L (ref 30–110)
ANION GAP SERPL CALC-SCNC: 10 MMOL/L (ref 5–19)
APPEARANCE UR: CLEAR
APTT BLD: 30.3 SEC (ref 23.5–35.8)
APTT PPP: YELLOW S
AST SERPL-CCNC: 30 U/L (ref 17–59)
BASOPHILS # BLD AUTO: 0.1 10^3/UL (ref 0–0.2)
BASOPHILS NFR BLD AUTO: 0.7 % (ref 0–2)
BILIRUB DIRECT SERPL-MCNC: 0.2 MG/DL (ref 0–0.4)
BILIRUB SERPL-MCNC: 0.7 MG/DL (ref 0.2–1.3)
BILIRUB UR QL STRIP: NEGATIVE
BUN SERPL-MCNC: 14 MG/DL (ref 7–20)
CALCIUM: 8.8 MG/DL (ref 8.4–10.2)
CHLORIDE SERPL-SCNC: 100 MMOL/L (ref 98–107)
CK MB SERPL-MCNC: 1.39 NG/ML (ref ?–4.55)
CK SERPL-CCNC: 223 U/L (ref 55–170)
CO2 SERPL-SCNC: 25 MMOL/L (ref 22–30)
EOSINOPHIL # BLD AUTO: 0 10^3/UL (ref 0–0.6)
EOSINOPHIL NFR BLD AUTO: 0.1 % (ref 0–6)
ERYTHROCYTE [DISTWIDTH] IN BLOOD BY AUTOMATED COUNT: 15.5 % (ref 11.5–14)
FREE T4 (FREE THYROXINE): 0.88 NG/DL (ref 0.78–2.19)
GLUCOSE SERPL-MCNC: 252 MG/DL (ref 75–110)
GLUCOSE UR STRIP-MCNC: 150 MG/DL
HCT VFR BLD CALC: 38.1 % (ref 37.9–51)
HGB BLD-MCNC: 12.9 G/DL (ref 13.5–17)
INR PPP: 1.07
KETONES UR STRIP-MCNC: NEGATIVE MG/DL
LYMPHOCYTES # BLD AUTO: 0.9 10^3/UL (ref 0.5–4.7)
LYMPHOCYTES NFR BLD AUTO: 9.2 % (ref 13–45)
MCH RBC QN AUTO: 31.1 PG (ref 27–33.4)
MCHC RBC AUTO-ENTMCNC: 33.9 G/DL (ref 32–36)
MCV RBC AUTO: 92 FL (ref 80–97)
MONOCYTES # BLD AUTO: 0.8 10^3/UL (ref 0.1–1.4)
MONOCYTES NFR BLD AUTO: 7.8 % (ref 3–13)
NEUTROPHILS # BLD AUTO: 8.2 10^3/UL (ref 1.7–8.2)
NEUTS SEG NFR BLD AUTO: 82.2 % (ref 42–78)
NITRITE UR QL STRIP: NEGATIVE
NT PRO BNP: 262 PG/ML (ref 5–900)
PH UR STRIP: 7 [PH] (ref 5–9)
PHOSPHATE SERPL-MCNC: 4 MG/DL (ref 2.5–4.5)
PLATELET # BLD: 225 10^3/UL (ref 150–450)
POTASSIUM SERPL-SCNC: 4.4 MMOL/L (ref 3.6–5)
PROT SERPL-MCNC: 6.5 G/DL (ref 6.3–8.2)
PROT UR STRIP-MCNC: NEGATIVE MG/DL
PROTHROMBIN TIME: 13.9 SEC (ref 11.4–15.4)
RBC # BLD AUTO: 4.15 10^6/UL (ref 4.35–5.55)
SP GR UR STRIP: 1.01
TOTAL CELLS COUNTED % (AUTO): 100 %
TROPONIN I SERPL-MCNC: < 0.012 NG/ML
TSH SERPL-ACNC: 0.62 UIU/ML (ref 0.47–4.68)
UROBILINOGEN UR-MCNC: NEGATIVE MG/DL (ref ?–2)
WBC # BLD AUTO: 9.9 10^3/UL (ref 4–10.5)

## 2019-08-14 PROCEDURE — 83036 HEMOGLOBIN GLYCOSYLATED A1C: CPT

## 2019-08-14 PROCEDURE — 84439 ASSAY OF FREE THYROXINE: CPT

## 2019-08-14 PROCEDURE — 73502 X-RAY EXAM HIP UNI 2-3 VIEWS: CPT

## 2019-08-14 PROCEDURE — 74177 CT ABD & PELVIS W/CONTRAST: CPT

## 2019-08-14 PROCEDURE — 80053 COMPREHEN METABOLIC PANEL: CPT

## 2019-08-14 PROCEDURE — 81001 URINALYSIS AUTO W/SCOPE: CPT

## 2019-08-14 PROCEDURE — 97110 THERAPEUTIC EXERCISES: CPT

## 2019-08-14 PROCEDURE — 83690 ASSAY OF LIPASE: CPT

## 2019-08-14 PROCEDURE — 84484 ASSAY OF TROPONIN QUANT: CPT

## 2019-08-14 PROCEDURE — 96376 TX/PRO/DX INJ SAME DRUG ADON: CPT

## 2019-08-14 PROCEDURE — 99285 EMERGENCY DEPT VISIT HI MDM: CPT

## 2019-08-14 PROCEDURE — 82140 ASSAY OF AMMONIA: CPT

## 2019-08-14 PROCEDURE — 87040 BLOOD CULTURE FOR BACTERIA: CPT

## 2019-08-14 PROCEDURE — 84443 ASSAY THYROID STIM HORMONE: CPT

## 2019-08-14 PROCEDURE — 85610 PROTHROMBIN TIME: CPT

## 2019-08-14 PROCEDURE — 83880 ASSAY OF NATRIURETIC PEPTIDE: CPT

## 2019-08-14 PROCEDURE — 83735 ASSAY OF MAGNESIUM: CPT

## 2019-08-14 PROCEDURE — 96374 THER/PROPH/DIAG INJ IV PUSH: CPT

## 2019-08-14 PROCEDURE — 97116 GAIT TRAINING THERAPY: CPT

## 2019-08-14 PROCEDURE — 74176 CT ABD & PELVIS W/O CONTRAST: CPT

## 2019-08-14 PROCEDURE — 97163 PT EVAL HIGH COMPLEX 45 MIN: CPT

## 2019-08-14 PROCEDURE — 82150 ASSAY OF AMYLASE: CPT

## 2019-08-14 PROCEDURE — 85025 COMPLETE CBC W/AUTO DIFF WBC: CPT

## 2019-08-14 PROCEDURE — 87086 URINE CULTURE/COLONY COUNT: CPT

## 2019-08-14 PROCEDURE — 96375 TX/PRO/DX INJ NEW DRUG ADDON: CPT

## 2019-08-14 PROCEDURE — G0378 HOSPITAL OBSERVATION PER HR: HCPCS

## 2019-08-14 PROCEDURE — 84100 ASSAY OF PHOSPHORUS: CPT

## 2019-08-14 PROCEDURE — 85730 THROMBOPLASTIN TIME PARTIAL: CPT

## 2019-08-14 PROCEDURE — 82553 CREATINE MB FRACTION: CPT

## 2019-08-14 PROCEDURE — 82550 ASSAY OF CK (CPK): CPT

## 2019-08-14 PROCEDURE — 36415 COLL VENOUS BLD VENIPUNCTURE: CPT

## 2019-08-14 RX ADMIN — SODIUM CHLORIDE PRN MLS/HR: 9 INJECTION, SOLUTION INTRAVENOUS at 21:38

## 2019-08-14 RX ADMIN — TAMSULOSIN HYDROCHLORIDE SCH MG: 0.4 CAPSULE ORAL at 21:38

## 2019-08-14 RX ADMIN — HYDROMORPHONE HYDROCHLORIDE PRN MG: 2 INJECTION INTRAMUSCULAR; INTRAVENOUS; SUBCUTANEOUS at 21:38

## 2019-08-14 RX ADMIN — FLUTICASONE FUROATE, UMECLIDINIUM BROMIDE AND VILANTEROL TRIFENATATE SCH INHALER: 100; 62.5; 25 POWDER RESPIRATORY (INHALATION) at 22:16

## 2019-08-14 NOTE — RADIOLOGY REPORT (SQ)
EXAM DESCRIPTION:

CT ABDOMEN PELVIS WITH IV CONTRAST



COMPLETED DATE/TME:  08/14/2019 00:00



CLINICAL HISTORY:

69 years Male, bladder tumor 



Comparison: None.



Technique:  IV contrast.  Coronal and sagittal reformat.  This

exam was performed according to our departmental

dose-optimization program, which includes automated exposure

control, adjustment of the mA and/or kV according to patient size

and/or use of iterative reconstruction technique.  [facility]: Dose

Right [facility]: CareDose   [facility]: Dose Right    [facility]: Teradose 4D   

[facility]: Smart Technologies



LIMITATIONS:

None



Findings: 

Acute comminuted intra-articular fracture of the right acetabulum

and right iliac wing.Acute nondisplaced fracture of the right

paracentral pubic symphysis and right inferior pubic ramus.

Likely enhancing solid appearing 3.9 cm left paracentral

posterior urinary bladder wall mass.

3.1 x 3.0 cm infrarenal abdominal aortic aneurysm. Recommend

follow-up every three years.

3 cm umbilical fat only hernia. Small left inguinal fat only

hernia.

Cholelithiasis.

Likely benign renal cyst(s), not definitively characterized.  

Atherosclerotic vascular disease.  

No ascites.  Inferior thorax, liver, gallbladder, pancreas,

spleen, adrenals, renal system, gastrointestinal tract, pelvic

organs, lymphatics, vasculature, and musculoskeleton appear

otherwise unremarkable.   



IMPRESSION:



1.  Acute comminuted intra-articular fracture of the right

acetabulum and right iliac wing. Acute nondisplaced fracture of

the right paracentral pubic symphysis and right inferior pubic

ramus.

2.  3.9 cm bladder mass. Differential etiologies include

infectious, inflammatory, hemorrhagic, and neoplastic processes.

3.  3.1 x 3.0 cm infrarenal abdominal aortic aneurysm. Recommend

follow-up every three years.

4.  Cholelithiasis.

## 2019-08-14 NOTE — ER DOCUMENT REPORT
ED General





- General


Chief Complaint: Hip Pain


Stated Complaint: HIP PAIN


Time Seen by Provider: 08/14/19 13:48


Primary Care Provider: 


VANDANA GARCIA MD [Primary Care Provider] - Follow up as needed


Mode of Arrival: Wheelchair


Information source: Patient


Notes: 





Patient is a 69-year-old male with history of COPD and CHF presenting after 

suffering a mechanical fall.  Patient reports he was walking and slipped on his 

front porch.  He states he fell directly onto his right hip is complaining of 

right hip and pelvis pain.  He states he is unable to bear any weight.  He 

denies any history of previous trauma to this area.





TRAVEL OUTSIDE OF THE [place] IN LAST 30 DAYS: No





- Related Data


Allergies/Adverse Reactions: 


                                        





No Known Allergies Allergy (Verified 08/14/19 13:15)


   











Past Medical History





- General


Information source: Patient





- Social History


Smoking Status: Current Every Day Smoker


Frequency of alcohol use: None


Drug Abuse: None


Family History: None


Patient has suicidal ideation: No


Patient has homicidal ideation: No


Pulmonary Medical History: Reports: Hx COPD


Renal/ Medical History: Denies: Hx Peritoneal Dialysis





Review of Systems





- Review of Systems


Constitutional: No symptoms reported


EENT: No symptoms reported


Cardiovascular: No symptoms reported


Respiratory: No symptoms reported


Gastrointestinal: No symptoms reported


Genitourinary: No symptoms reported


Male Genitourinary: No symptoms reported


Musculoskeletal: See HPI


Skin: No symptoms reported


Hematologic/Lymphatic: No symptoms reported


Neurological/Psychological: No symptoms reported





Physical Exam





- Vital signs


Vitals: 


                                        











Temp Pulse Resp BP


 


 98.1 F   78   20   148/90 H


 


 08/14/19 13:32  08/14/19 13:32  08/14/19 13:32  08/14/19 13:32














- Notes


Notes: 





PHYSICAL EXAMINATION:





GENERAL: Well-appearing, well-nourished and in moderate distress.





HEAD: Atraumatic, normocephalic.





EYES: Pupils equal round and reactive to light, extraocular movements intact, 

sclera anicteric, conjunctiva are normal.





ENT: Nares patent, oropharynx clear without exudates.  Moist mucous membranes.





NECK: Normal range of motion, supple without lymphadenopathy





LUNGS: Breath sounds clear to auscultation bilaterally and equal.  No wheezes 

rales or rhonchi.





HEART: Regular rate and rhythm without murmurs





ABDOMEN: Soft, nontender, nondistended abdomen.  No guarding, no rebound.  No 

masses appreciated.





Musculoskeletal: Limited ROM to left lower extremiy due to pain.  Strong femoral

pulse, strong dorsalis pedis and posterior tibialis pulse, normal sensation 

distal to area of injury.  No obvious rotation or shortening noted.





NEUROLOGICAL: Cranial nerves grossly intact.  Normal speech.  Normal sensory, 

motor exams 





PSYCH: Normal mood, normal affect.





SKIN: Warm, Dry, normal turgor, no rashes or lesions noted.








Course





- Re-evaluation


Re-evalutation: 





08/14/19 17:34





                                        





Hip/Pelvis X-Ray  08/14/19 00:00


IMPRESSION:  NEGATIVE STUDY OF THE RIGHT HIP. NO RADIOGRAPHIC EVIDENCE OF ACUTE 

INJURY.


 








Abdomen/Pelvis CT  08/14/19 16:13


IMPRESSION:  Nondisplaced superior and inferior pubic rami fractures on the 

right.


Nondisplaced vertical coronal fractures extending into the acetabulum on the 

right.  Best seen on sagittal imaging.


Hyperdense mass at the base of the bladder on the left.  Tumor versus hemo

rrhage.


Gallstone.


3.4 cm infrarenal abdominal aortic aneurysm.


 








Call placed to on-call orthopedic, Dr. Crowell.  Currently awaiting callback.  

Patient's pain currently managed.





08/14/19 17:47


Patient accepted for admission by patient's primary care physician, Dr. Garcia.








- Vital Signs


Vital signs: 


                                        











Temp Pulse Resp BP Pulse Ox


 


 98.1 F   78   20   148/90 H   


 


 08/14/19 13:32  08/14/19 13:32  08/14/19 13:32  08/14/19 13:32   














Discharge





- Discharge


Clinical Impression: 


Pubic ramus fracture


Qualifiers:


 Encounter type: initial encounter Fracture type: closed Laterality: right 

Qualified Code(s): S32.591A - Other specified fracture of right pubis, initial 

encounter for closed fracture





Acetabulum fracture, right


Qualifiers:


 Encounter type: initial encounter Sublocation of acetabulum: unspecified 

portion of acetabulum Fracture type: closed Fracture alignment: nondisplaced 

Qualified Code(s): S32.401A - Unspecified fracture of right acetabulum, initial 

encounter for closed fracture





Condition: Stable


Disposition: ADMITTED AS INPATIENT


Admitting Provider: aRfael


Unit Admitted: Telemetry


Referrals: 


VANDANA GARCIA MD [Primary Care Provider] - Follow up as needed

## 2019-08-14 NOTE — RADIOLOGY REPORT (SQ)
EXAM DESCRIPTION:  HIP RIGHT AP/LATERAL



COMPLETED DATE/TIME:  8/14/2019 1:58 pm



REASON FOR STUDY:  bed 11 r/o hip fracture unable to put Wgt on it



COMPARISON:  None.



NUMBER OF VIEWS:  Two views.



TECHNIQUE:  AP pelvis and additional frog-leg view of the right hip.



LIMITATIONS:  None.



FINDINGS:  MINERALIZATION: Normal.

RIGHT HIP: No fracture or dislocation.  No worrisome bone lesions.

LEFT HIP: No fracture or dislocation.  No worrisome bone lesions.

PUBIS AND ISCHIUM: No fracture.

PELVIS: No fracture.

SACRUM: No fracture or dislocation. No worrisome bone lesions.

LOWER LUMBAR SPINE: No fracture or dislocation. No worrisome bone lesions.  No significant disc disea
se.

SOFT TISSUES: No findings.

OTHER: No other significant finding.



IMPRESSION:  NEGATIVE STUDY OF THE RIGHT HIP. NO RADIOGRAPHIC EVIDENCE OF ACUTE INJURY.



COMMENT:  Fractures are often occult on plain radiographs.  If strong clinical suspicion for fracture
, recommend CT or MR.



TECHNICAL DOCUMENTATION:  JOB ID:  [id number]

 2011 [employer]- All Rights Reserved



Reading location - IP/workstation name: [email or web address]

## 2019-08-14 NOTE — RADIOLOGY REPORT (SQ)
EXAM DESCRIPTION:  CT ABD/PELVIS NO ORAL OR IV



COMPLETED DATE/TIME:  8/14/2019 4:46 pm



REASON FOR STUDY:  fall, left pelvic pain



COMPARISON:  None.



TECHNIQUE:  CT scan of the abdomen and pelvis performed without intravenous or oral contrast. Images 
reviewed with lung, soft tissue, and bone windows. Reconstructed coronal and sagittal MPR images revi
ewed. All images stored on PACS.

All CT scanners at this facility use dose modulation, iterative reconstruction, and/or weight based d
osing when appropriate to reduce radiation dose to as low as reasonably achievable (ALARA).

[facility]: Dose Right  [facility]: CareDose    [facility]: Dose Right    [facility]: Teradose 4D    [facility]: Smart Technologies



RADIATION DOSE:  CT Rad equipment meets quality standard of care and radiation dose reduction techniq
ues were employed. CTDIvol: 11.6 mGy. DLP: 675 mGy-cm.mGy.



LIMITATIONS:  None.



FINDINGS:  LOWER CHEST: No significant findings. No nodules or infiltrates.

NON-CONTRASTED LIVER, SPLEEN, ADRENALS: Evaluation limited by lack of IV contrast. No identified sign
ificant masses.

PANCREAS: No masses. No peripancreatic inflammatory changes.

GALLBLADDER: No identified stones by CT criteria. No inflammatory changes to suggest cholecystitis.

RIGHT KIDNEY AND URETER: No suspicious masses. Assessment limited by lack of IV contrast.   No signif
icant calcifications.   No hydronephrosis or hydroureter.

LEFT KIDNEY AND URETER: Well circumscribed low density mass(es) statistically most likely to be cyst(
s). Assessment limited by lack of iv contrast.   No significant calcifications.   No hydronephrosis o
r hydroureter.

AORTA AND RETROPERITONEUM: 3.4 cm infrarenal abdominal aortic aneurysm.

BOWEL AND PERITONEAL CAVITY: No obvious masses or inflammatory changes. No free fluid.

APPENDIX: Normal.

PELVIS, BLADDER, AND ABDOMINAL WALL:There is a hyperdense 4 cm mass at the base of the bladder on the
 left.  Tumor versus hemorrhage.

BONES: Nondisplaced superior and inferior pubic rami fractures on the right.  There also nondisplaced
 vertical coronal fractures extending into the acetabulum on the right.  This is best seen on sagitta
l imaging.

OTHER: No other significant finding.



IMPRESSION:  Nondisplaced superior and inferior pubic rami fractures on the right.

Nondisplaced vertical coronal fractures extending into the acetabulum on the right.  Best seen on sag
ittal imaging.

Hyperdense mass at the base of the bladder on the left.  Tumor versus hemorrhage.

Gallstone.

3.4 cm infrarenal abdominal aortic aneurysm.



COMMENT:  Quality ID # [id number]: Final reports with documentation of one or more dose reduction techniques
 (e.g., Automated exposure control, adjustment of the mA and/or kV according to patient size, use of 
iterative reconstruction technique)



TECHNICAL DOCUMENTATION:  JOB ID:  [id number]

 2011 [employer]- All Rights Reserved



Reading location - IP/workstation name: [email or web address]

## 2019-08-15 LAB
ADD MANUAL DIFF: NO
ALBUMIN SERPL-MCNC: 3.5 G/DL (ref 3.5–5)
ALP SERPL-CCNC: 66 U/L (ref 38–126)
ANION GAP SERPL CALC-SCNC: 6 MMOL/L (ref 5–19)
AST SERPL-CCNC: 22 U/L (ref 17–59)
BASOPHILS # BLD AUTO: 0.1 10^3/UL (ref 0–0.2)
BASOPHILS NFR BLD AUTO: 0.7 % (ref 0–2)
BILIRUB DIRECT SERPL-MCNC: 0.3 MG/DL (ref 0–0.4)
BILIRUB SERPL-MCNC: 0.7 MG/DL (ref 0.2–1.3)
BUN SERPL-MCNC: 13 MG/DL (ref 7–20)
CALCIUM: 8.8 MG/DL (ref 8.4–10.2)
CHLORIDE SERPL-SCNC: 100 MMOL/L (ref 98–107)
CK MB SERPL-MCNC: 1.3 NG/ML (ref ?–4.55)
CK MB SERPL-MCNC: 1.3 NG/ML (ref ?–4.55)
CO2 SERPL-SCNC: 29 MMOL/L (ref 22–30)
EOSINOPHIL # BLD AUTO: 0 10^3/UL (ref 0–0.6)
EOSINOPHIL NFR BLD AUTO: 0.6 % (ref 0–6)
ERYTHROCYTE [DISTWIDTH] IN BLOOD BY AUTOMATED COUNT: 15.8 % (ref 11.5–14)
GLUCOSE SERPL-MCNC: 193 MG/DL (ref 75–110)
HCT VFR BLD CALC: 35.6 % (ref 37.9–51)
HGB BLD-MCNC: 12.2 G/DL (ref 13.5–17)
LYMPHOCYTES # BLD AUTO: 1.2 10^3/UL (ref 0.5–4.7)
LYMPHOCYTES NFR BLD AUTO: 15.1 % (ref 13–45)
MCH RBC QN AUTO: 31.5 PG (ref 27–33.4)
MCHC RBC AUTO-ENTMCNC: 34.3 G/DL (ref 32–36)
MCV RBC AUTO: 92 FL (ref 80–97)
MONOCYTES # BLD AUTO: 0.9 10^3/UL (ref 0.1–1.4)
MONOCYTES NFR BLD AUTO: 11.3 % (ref 3–13)
NEUTROPHILS # BLD AUTO: 5.7 10^3/UL (ref 1.7–8.2)
NEUTS SEG NFR BLD AUTO: 72.3 % (ref 42–78)
PLATELET # BLD: 207 10^3/UL (ref 150–450)
POTASSIUM SERPL-SCNC: 4.6 MMOL/L (ref 3.6–5)
PROT SERPL-MCNC: 5.9 G/DL (ref 6.3–8.2)
RBC # BLD AUTO: 3.87 10^6/UL (ref 4.35–5.55)
TOTAL CELLS COUNTED % (AUTO): 100 %
TROPONIN I SERPL-MCNC: < 0.012 NG/ML
TROPONIN I SERPL-MCNC: < 0.012 NG/ML
WBC # BLD AUTO: 7.8 10^3/UL (ref 4–10.5)

## 2019-08-15 RX ADMIN — FLUTICASONE FUROATE, UMECLIDINIUM BROMIDE AND VILANTEROL TRIFENATATE SCH INHALER: 100; 62.5; 25 POWDER RESPIRATORY (INHALATION) at 10:00

## 2019-08-15 RX ADMIN — HEPARIN SODIUM SCH UNIT: 5000 INJECTION, SOLUTION INTRAVENOUS; SUBCUTANEOUS at 14:00

## 2019-08-15 RX ADMIN — TAMSULOSIN HYDROCHLORIDE SCH MG: 0.4 CAPSULE ORAL at 09:59

## 2019-08-15 RX ADMIN — HEPARIN SODIUM SCH UNIT: 5000 INJECTION, SOLUTION INTRAVENOUS; SUBCUTANEOUS at 21:15

## 2019-08-15 RX ADMIN — HEPARIN SODIUM SCH: 5000 INJECTION, SOLUTION INTRAVENOUS; SUBCUTANEOUS at 00:56

## 2019-08-15 RX ADMIN — HYDROMORPHONE HYDROCHLORIDE PRN MG: 2 INJECTION INTRAMUSCULAR; INTRAVENOUS; SUBCUTANEOUS at 21:15

## 2019-08-15 RX ADMIN — HEPARIN SODIUM SCH UNIT: 5000 INJECTION, SOLUTION INTRAVENOUS; SUBCUTANEOUS at 05:33

## 2019-08-15 RX ADMIN — HYDROMORPHONE HYDROCHLORIDE PRN MG: 2 INJECTION INTRAMUSCULAR; INTRAVENOUS; SUBCUTANEOUS at 05:32

## 2019-08-15 RX ADMIN — HYDROMORPHONE HYDROCHLORIDE PRN MG: 2 INJECTION INTRAMUSCULAR; INTRAVENOUS; SUBCUTANEOUS at 15:30

## 2019-08-15 RX ADMIN — HYDROMORPHONE HYDROCHLORIDE PRN MG: 2 INJECTION INTRAMUSCULAR; INTRAVENOUS; SUBCUTANEOUS at 09:59

## 2019-08-15 NOTE — PDOC CONSULTATION
Consultation


Consult Date: 08/15/19


Attending physician:: VANDANA GARCIA


Provider Consulted: MICHELLE JOHNSON


Consult reason:: 1.  Nondisplaced right acetabulum fracture.  2.  Nondisplaced 

pelvic fracture: Superior and inferior pubic rami fractures





History of Present Illness


Admission Date/PCP: 


  08/14/19 18:15





  VANDANA GARCIA MD





Patient complains of: right hip pain


History of Present Illness: 


KALA VIERA is a 69 year old male who sustained a low energy fall at home 

yesterday.  He states that he slipped on his front porch.  He had presented to 

the emergency department complaining of pain in the right hip as well as an 

inability to bear weight on his leg.  CT scan of the right hip demonstrated 

nondisplaced fractures of the pelvis and acetabulum.








Past Medical History


Cardiac Medical History: Reports: Congestive Heart Failure


Pulmonary Medical History: Reports: Chronic Obstructive Pulmonary Disease (COPD)


Psychiatric Medical History: 


   Denies: Depression





Social History


Information Source: Patient


Lives with: Spouse/Significant other - Wife


Smoking Status: Former Smoker


Frequency of Alcohol Use: Rare


Hx Recreational Drug Use: No


Drugs: None


Hx Prescription Drug Abuse: No





- Advance Directive


Resuscitation Status: Full Code





Family History


Family History: None.  denies: Reviewed & Not Pertinent, Arthritis, CAD, COPD, 

CVA, DM, Hyperlipidemia, Hypertension, Malignancy, Thyroid Disfunction, Other


Parental Family History Reviewed: Yes


Children Family History Reviewed: NA


Sibling(s) Family History Reviewed.: NA





Medication/Allergy


Home Medications: 








Albuterol Sulfate [Ventolin 0.083% Neb 2.5 mg/3 mL Ampul] 3 ml NEB Q4HP PRN 

08/14/19 


Fluticasone/Umeclidin/Vilanter [Trelegy 100-62.5-25 Mcg Ellipta 14 Dose/Dpi] 1 

puff IH DAILY 08/14/19 


Tamsulosin HCl [Flomax 0.4 mg Cap.sr] 0.4 mg PO DAILY 08/14/19 








Allergies/Adverse Reactions: 


                                        





No Known Allergies Allergy (Verified 08/14/19 13:15)


   











Physical Exam


Vital Signs: 


                                        











Temp Pulse Resp BP Pulse Ox


 


 98.1 F   78   16   129/75 H  97 


 


 08/15/19 15:14  08/15/19 15:14  08/15/19 15:14  08/15/19 15:14  08/15/19 15:14








                                 Intake & Output











 08/14/19 08/15/19 08/16/19





 06:59 06:59 06:59


 


Intake Total  1850 240


 


Output Total  1675 200


 


Balance  175 40


 


Weight  86.6 kg 











General appearance: PRESENT: no acute distress


Head exam: PRESENT: atraumatic, normocephalic


Eye exam: PRESENT: conjunctiva pink, EOMI, PERRLA.  ABSENT: scleral icterus


Ear exam: PRESENT: normal external ear exam


Mouth exam: PRESENT: moist, tongue midline.  ABSENT: dry mucosa, laceration, 

neck supple, other


Neck exam: ABSENT: carotid bruit - Normal exam, JVD, lymphadenopathy, 

thyromegaly


Respiratory exam: PRESENT: clear to auscultation sylvester.  ABSENT: rales, rhonchi, 

wheezes


Cardiovascular exam: PRESENT: RRR.  ABSENT: diastolic murmur, rubs, systolic 

murmur


Pulses: PRESENT: +2 pedal pulses bilateral


Vascular exam: PRESENT: normal capillary refill


GI/Abdominal exam: PRESENT: normal bowel sounds, soft.  ABSENT: distended, 

guarding, mass, organolmegaly, rebound, tenderness


Rectal exam: PRESENT: deferred


Extremities exam: PRESENT: other - There is tenderness to palpation of the 

superior and inferior pubic rami of the right hemipelvis.  There is discomfort 

in the groin with gentle internal and external rotation of the right hip.  There

is normal motion of the knee, ankle, and foot without discomfort.  Sensation is 

intact to touch.  Pulses are normal..  ABSENT: calf tenderness, clubbing, full 

ROM, joint swelling, pedal edema, tenderness, +1 edema, +2 edema


Musculoskeletal exam: PRESENT: other - There is tenderness to palpation of the 

superior and inferior pubic rami of the right hemipelvis.  There is discomfort 

in the groin with gentle internal and external rotation of the right hip.  There

is normal motion of the knee, ankle, and foot without discomfort.


Neurological exam: PRESENT: alert, awake, oriented to person, oriented to place,

oriented to time, oriented to situation, CN II-XII grossly intact.  ABSENT: 

motor sensory deficit


Psychiatric exam: PRESENT: appropriate affect, normal mood.  ABSENT: homicidal 

ideation, suicidal ideation


Skin exam: PRESENT: dry, intact, warm.  ABSENT: cyanosis, rash





Results


Laboratory Results: 


                                        





                                 08/15/19 03:02 





                                 08/15/19 03:02 





                                        











  08/14/19 08/14/19 08/14/19





  20:35 20:35 20:35


 


WBC  9.9  


 


RBC  4.15 L  


 


Hgb  12.9 L  


 


Hct  38.1  


 


MCV  92  


 


MCH  31.1  


 


MCHC  33.9  


 


RDW  15.5 H  


 


Plt Count  225  


 


Seg Neutrophils %  82.2 H  


 


Lymphocytes %  9.2 L  


 


Monocytes %  7.8  


 


Eosinophils %  0.1  


 


Basophils %  0.7  


 


Absolute Neutrophils  8.2  


 


Absolute Lymphocytes  0.9  


 


Absolute Monocytes  0.8  


 


Absolute Eosinophils  0.0  


 


Absolute Basophils  0.1  


 


Sodium   135.2 L 


 


Potassium   4.4 


 


Chloride   100 


 


Carbon Dioxide   25 


 


Anion Gap   10 


 


BUN   14 


 


Creatinine   0.81 


 


Est GFR ( Amer)   > 60 


 


Est GFR (Non-Af Amer)   > 60 


 


Glucose   252 H 


 


Calcium   8.8 


 


Phosphorus   4.0 


 


Magnesium   1.7 


 


Total Bilirubin   0.7 


 


AST   30 


 


Alkaline Phosphatase   75 


 


Ammonia   


 


Total Protein   6.5 


 


Albumin   4.0 


 


Amylase   53 


 


Lipase   24.3 


 


TSH    0.62


 


Free T4    0.88


 


Urine Color   


 


Urine Appearance   


 


Urine pH   


 


Ur Specific Gravity   


 


Urine Protein   


 


Urine Glucose (UA)   


 


Urine Ketones   


 


Urine Blood   


 


Urine Nitrite   


 


Ur Leukocyte Esterase   


 


Urine WBC (Auto)   


 


Urine RBC (Auto)   














  08/14/19 08/14/19 08/15/19





  20:35 23:05 03:02


 


WBC    7.8


 


RBC    3.87 L


 


Hgb    12.2 L


 


Hct    35.6 L


 


MCV    92


 


MCH    31.5


 


MCHC    34.3


 


RDW    15.8 H


 


Plt Count    207


 


Seg Neutrophils %    72.3


 


Lymphocytes %    15.1


 


Monocytes %    11.3


 


Eosinophils %    0.6


 


Basophils %    0.7


 


Absolute Neutrophils    5.7


 


Absolute Lymphocytes    1.2


 


Absolute Monocytes    0.9


 


Absolute Eosinophils    0.0


 


Absolute Basophils    0.1


 


Sodium   


 


Potassium   


 


Chloride   


 


Carbon Dioxide   


 


Anion Gap   


 


BUN   


 


Creatinine   


 


Est GFR ( Amer)   


 


Est GFR (Non-Af Amer)   


 


Glucose   


 


Calcium   


 


Phosphorus   


 


Magnesium   


 


Total Bilirubin   


 


AST   


 


Alkaline Phosphatase   


 


Ammonia  < 8.7 L  


 


Total Protein   


 


Albumin   


 


Amylase   


 


Lipase   


 


TSH   


 


Free T4   


 


Urine Color   YELLOW 


 


Urine Appearance   CLEAR 


 


Urine pH   7.0 


 


Ur Specific [place]   1.011 


 


Urine Protein   NEGATIVE 


 


Urine Glucose (UA)   150 H 


 


Urine Ketones   NEGATIVE 


 


Urine Blood   NEGATIVE 


 


Urine Nitrite   NEGATIVE 


 


Ur Leukocyte Esterase   NEGATIVE 


 


Urine WBC (Auto)   2 


 


Urine RBC (Auto)   0 














  08/15/19





  03:02


 


WBC 


 


RBC 


 


Hgb 


 


Hct 


 


MCV 


 


MCH 


 


MCHC 


 


RDW 


 


Plt Count 


 


Seg Neutrophils % 


 


Lymphocytes % 


 


Monocytes % 


 


Eosinophils % 


 


Basophils % 


 


Absolute Neutrophils 


 


Absolute Lymphocytes 


 


Absolute Monocytes 


 


Absolute Eosinophils 


 


Absolute Basophils 


 


Sodium  134.5 L


 


Potassium  4.6


 


Chloride  100


 


Carbon Dioxide  29


 


Anion Gap  6


 


BUN  13


 


Creatinine  0.77


 


Est GFR ( Amer)  > 60


 


Est GFR (Non-Af Amer)  > 60


 


Glucose  193 H


 


Calcium  8.8


 


Phosphorus 


 


Magnesium 


 


Total Bilirubin  0.7


 


AST  22


 


Alkaline Phosphatase  66


 


Ammonia 


 


Total Protein  5.9 L


 


Albumin  3.5


 


Amylase 


 


Lipase 


 


TSH 


 


Free T4 


 


Urine Color 


 


Urine Appearance 


 


Urine pH 


 


Ur Specific Gravity 


 


Urine Protein 


 


Urine Glucose (UA) 


 


Urine Ketones 


 


Urine Blood 


 


Urine Nitrite 


 


Ur Leukocyte Esterase 


 


Urine WBC (Auto) 


 


Urine RBC (Auto) 








                                        











  08/14/19 08/14/19 08/15/19





  20:35 20:35 03:02


 


Creatine Kinase  223 H   186 H


 


CK-MB (CK-2)   1.39 


 


Troponin I   < 0.012 


 


NT-Pro-B Natriuret Pep   262 














  08/15/19 08/15/19 08/15/19





  03:02 09:44 09:44


 


Creatine Kinase   152 


 


CK-MB (CK-2)  1.30   1.30


 


Troponin I  < 0.012   < 0.012


 


NT-Pro-B Natriuret Pep   











Impressions: 


                                        





Hip/Pelvis X-Ray  08/14/19 00:00


IMPRESSION:  NEGATIVE STUDY OF THE RIGHT HIP. NO RADIOGRAPHIC EVIDENCE OF ACUTE 

INJURY.


 








Abdomen/Pelvis CT  08/14/19 16:13


IMPRESSION:  Nondisplaced superior and inferior pubic rami fractures on the 

right.


Nondisplaced vertical coronal fractures extending into the acetabulum on the 

right.  Best seen on sagittal imaging.


Hyperdense mass at the base of the bladder on the left.  Tumor versus 

hemorrhage.


Gallstone.


3.4 cm infrarenal abdominal aortic aneurysm.


 














Assessment & Plan





- Diagnosis


(1) Acetabulum fracture, right


Qualifiers: 


   Encounter type: initial encounter   Sublocation of acetabulum: dome   

Fracture type: closed   Fracture alignment: nondisplaced   Qualified Code(s): 

S32.484A - Nondisplaced dome fracture of right acetabulum, initial encounter for

closed fracture   


Is this a current diagnosis for this admission?: Yes   


Plan: 


The fracture of the acetabulum is nondisplaced.  I recommended nonoperative 

treatment.  I have ordered physical therapy for ambulation training.  The 

patient may proceed with weightbearing as tolerated on the right extremity.  I 

have discussed with the patient that initially he will likely place very little 

weight on the extremity and his tolerance for weightbearing will parallel 

fracture consolidation.  We have discussed the course of nonoperative treatment 

as well as expectations for the duration of full recovery.  He understands that 

if symptoms worsen or change or if he experiences an additional traumatic event,

he requires urgent evaluation with imaging.








(2) Pubic ramus fracture


Qualifiers: 


   Encounter type: initial encounter   Fracture type: closed   Laterality: right

  Qualified Code(s): S32.591A - Other specified fracture of right pubis, initial

encounter for closed fracture   


Plan: 


Fractures of the pelvis are nondisplaced.  I have recommended nonoperative 

treatment.  I have ordered physical therapy for ambulation training.  The 

patient may be weightbearing as tolerated with assist device.








- Time


Time Spent: 50 to 70 Minutes


Anticipated discharge: SNF


Within: within 72 hours

## 2019-08-16 LAB
ADD MANUAL DIFF: NO
BASOPHILS # BLD AUTO: 0.1 10^3/UL (ref 0–0.2)
BASOPHILS NFR BLD AUTO: 1 % (ref 0–2)
EOSINOPHIL # BLD AUTO: 0.1 10^3/UL (ref 0–0.6)
EOSINOPHIL NFR BLD AUTO: 2.2 % (ref 0–6)
ERYTHROCYTE [DISTWIDTH] IN BLOOD BY AUTOMATED COUNT: 15.7 % (ref 11.5–14)
HCT VFR BLD CALC: 36 % (ref 37.9–51)
HGB BLD-MCNC: 12.1 G/DL (ref 13.5–17)
LYMPHOCYTES # BLD AUTO: 1.4 10^3/UL (ref 0.5–4.7)
LYMPHOCYTES NFR BLD AUTO: 23.8 % (ref 13–45)
MCH RBC QN AUTO: 31.4 PG (ref 27–33.4)
MCHC RBC AUTO-ENTMCNC: 33.6 G/DL (ref 32–36)
MCV RBC AUTO: 93 FL (ref 80–97)
MONOCYTES # BLD AUTO: 0.8 10^3/UL (ref 0.1–1.4)
MONOCYTES NFR BLD AUTO: 13.9 % (ref 3–13)
NEUTROPHILS # BLD AUTO: 3.5 10^3/UL (ref 1.7–8.2)
NEUTS SEG NFR BLD AUTO: 59.1 % (ref 42–78)
PLATELET # BLD: 203 10^3/UL (ref 150–450)
RBC # BLD AUTO: 3.86 10^6/UL (ref 4.35–5.55)
TOTAL CELLS COUNTED % (AUTO): 100 %
WBC # BLD AUTO: 5.9 10^3/UL (ref 4–10.5)

## 2019-08-16 RX ADMIN — TAMSULOSIN HYDROCHLORIDE SCH MG: 0.4 CAPSULE ORAL at 09:34

## 2019-08-16 RX ADMIN — HYDROMORPHONE HYDROCHLORIDE PRN MG: 2 INJECTION INTRAMUSCULAR; INTRAVENOUS; SUBCUTANEOUS at 15:13

## 2019-08-16 RX ADMIN — HEPARIN SODIUM SCH UNIT: 5000 INJECTION, SOLUTION INTRAVENOUS; SUBCUTANEOUS at 15:14

## 2019-08-16 RX ADMIN — HEPARIN SODIUM SCH UNIT: 5000 INJECTION, SOLUTION INTRAVENOUS; SUBCUTANEOUS at 21:16

## 2019-08-16 RX ADMIN — FLUTICASONE FUROATE, UMECLIDINIUM BROMIDE AND VILANTEROL TRIFENATATE SCH INHALER: 100; 62.5; 25 POWDER RESPIRATORY (INHALATION) at 09:34

## 2019-08-16 RX ADMIN — HYDROMORPHONE HYDROCHLORIDE PRN MG: 2 INJECTION INTRAMUSCULAR; INTRAVENOUS; SUBCUTANEOUS at 03:26

## 2019-08-16 RX ADMIN — SODIUM CHLORIDE PRN MLS/HR: 9 INJECTION, SOLUTION INTRAVENOUS at 16:18

## 2019-08-16 RX ADMIN — HEPARIN SODIUM SCH: 5000 INJECTION, SOLUTION INTRAVENOUS; SUBCUTANEOUS at 06:45

## 2019-08-16 RX ADMIN — HYDROMORPHONE HYDROCHLORIDE PRN MG: 2 INJECTION INTRAMUSCULAR; INTRAVENOUS; SUBCUTANEOUS at 10:41

## 2019-08-16 RX ADMIN — HYDROMORPHONE HYDROCHLORIDE PRN MG: 2 INJECTION INTRAMUSCULAR; INTRAVENOUS; SUBCUTANEOUS at 21:15

## 2019-08-17 VITALS — SYSTOLIC BLOOD PRESSURE: 140 MMHG | DIASTOLIC BLOOD PRESSURE: 98 MMHG

## 2019-08-17 LAB
ADD MANUAL DIFF: NO
BASOPHILS # BLD AUTO: 0 10^3/UL (ref 0–0.2)
BASOPHILS NFR BLD AUTO: 0.8 % (ref 0–2)
EOSINOPHIL # BLD AUTO: 0.2 10^3/UL (ref 0–0.6)
EOSINOPHIL NFR BLD AUTO: 2.6 % (ref 0–6)
ERYTHROCYTE [DISTWIDTH] IN BLOOD BY AUTOMATED COUNT: 15.8 % (ref 11.5–14)
HCT VFR BLD CALC: 36.7 % (ref 37.9–51)
HGB BLD-MCNC: 12.6 G/DL (ref 13.5–17)
LYMPHOCYTES # BLD AUTO: 1.6 10^3/UL (ref 0.5–4.7)
LYMPHOCYTES NFR BLD AUTO: 26 % (ref 13–45)
MCH RBC QN AUTO: 31.7 PG (ref 27–33.4)
MCHC RBC AUTO-ENTMCNC: 34.3 G/DL (ref 32–36)
MCV RBC AUTO: 92 FL (ref 80–97)
MONOCYTES # BLD AUTO: 0.9 10^3/UL (ref 0.1–1.4)
MONOCYTES NFR BLD AUTO: 14.8 % (ref 3–13)
NEUTROPHILS # BLD AUTO: 3.5 10^3/UL (ref 1.7–8.2)
NEUTS SEG NFR BLD AUTO: 55.8 % (ref 42–78)
PLATELET # BLD: 207 10^3/UL (ref 150–450)
RBC # BLD AUTO: 3.98 10^6/UL (ref 4.35–5.55)
TOTAL CELLS COUNTED % (AUTO): 100 %
WBC # BLD AUTO: 6.3 10^3/UL (ref 4–10.5)

## 2019-08-17 RX ADMIN — FLUTICASONE FUROATE, UMECLIDINIUM BROMIDE AND VILANTEROL TRIFENATATE SCH INHALER: 100; 62.5; 25 POWDER RESPIRATORY (INHALATION) at 09:32

## 2019-08-17 RX ADMIN — TAMSULOSIN HYDROCHLORIDE SCH MG: 0.4 CAPSULE ORAL at 09:31

## 2019-08-17 RX ADMIN — SODIUM CHLORIDE PRN MLS/HR: 9 INJECTION, SOLUTION INTRAVENOUS at 02:15

## 2019-08-17 RX ADMIN — HEPARIN SODIUM SCH: 5000 INJECTION, SOLUTION INTRAVENOUS; SUBCUTANEOUS at 05:07

## 2019-08-17 RX ADMIN — HEPARIN SODIUM SCH: 5000 INJECTION, SOLUTION INTRAVENOUS; SUBCUTANEOUS at 13:04

## 2019-08-17 RX ADMIN — HYDROMORPHONE HYDROCHLORIDE PRN MG: 2 INJECTION INTRAMUSCULAR; INTRAVENOUS; SUBCUTANEOUS at 02:15

## 2019-08-17 NOTE — PDOC DISCHARGE SUMMARY
General





- Admit/Disc Date/PCP


Admission Date/Primary Care Provider: 


  08/14/19 18:15





  VANDANA GARCIA MD





Discharge Date: 08/17/19





- Discharge Diagnosis


(1) Acetabulum fracture, right


Is this a current diagnosis for this admission?: Yes   





(2) Pubic ramus fracture


Is this a current diagnosis for this admission?: Yes   





(3) Abdominal aortic aneurysm


Is this a current diagnosis for this admission?: Yes   





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


Is this a current diagnosis for this admission?: Yes   





- Additional Information


Resuscitation Status: Full Code


Prescriptions: 


Oxycodone HCl/Acetaminophen [Percocet 5-325 mg Tablet] 1 tab PO Q4HP PRN #28 

tablet


 PRN Reason: 


Home Medications: 








Albuterol Sulfate [Ventolin 0.083% Neb 2.5 mg/3 mL Ampul] 3 ml NEB Q4HP PRN 08/ 14/19 


Fluticasone/Umeclidin/Vilanter [Trelegy 100-62.5-25 Mcg Ellipta 14 Dose/Dpi] 1 

puff IH DAILY 08/14/19 


Tamsulosin HCl [Flomax 0.4 mg Cap.sr] 0.4 mg PO DAILY 08/14/19 


Oxycodone HCl/Acetaminophen [Percocet 5-325 mg Tablet] 1 tab PO Q4HP PRN #28 

tablet 08/17/19 











History of Present Illness


History of Present Illness: 


KALA VIERA is a 69 year old male,He came to the emergency room for evaluation

of hip pain after a fall at home,in  the emergency room a CAT scan of the 

abdomen and pelvis without contrast was obtained there was a well circumscribed 

low-density mass in the left kidney most likely cyst also found was a 4 cm mass 

at the base of the left bladder there was also a nondisplaced superior and 

inferior pubic rami fracture on the right pelvis with a nondisplaced fracture 

extending into the acetabulum, patient was offered hospital admission for 

management of his symptoms.  There was no antecedent loss of consciousness, no 

chest pain ,Subsequent CAT scan with contrast of the abdomen and pelvis was 

obtained it again demonstrated acute comminuted intra-articular fracture of the 

right acetabulum and  the right iliac wing, acute nondisplaced fracture of the 

right paracentral pubic symphysis and  the right inferior pubic ramus and also 

an enhancing solid mass that measure 3.9 cm in the posterior urinary bladder 

wall this is suspicious for neoplasm especially in this patient with History of 

smoking








Hospital Course


Hospital Course: 





Patient was admitted for the management of pelvic bone fracture, he was managed 

conservatively, he was seen by orthopedic, no surgical intervention is recomme

nded.  He was found to have a neoplasm in the urinary bladder from the CAT scan 

that was done, he stated that he is aware of the neoplasm in his bladder, he 

follows with urology is supposed to have cystoscopy.  Pain control was achieved 

with IV Dilaudid, he was seen by physical therapy





Physical Exam


Vital Signs: 


                                        











Temp Pulse Resp BP Pulse Ox


 


 98.0 F   66   16   140/98 H  98 


 


 08/17/19 04:00  08/17/19 07:00  08/17/19 04:00  08/17/19 04:00  08/17/19 04:00








                                 Intake & Output











 08/16/19 08/17/19 08/18/19





 06:59 06:59 06:59


 


Intake Total 1625 3705 


 


Output Total 2625 3200 


 


Balance -1000 505 


 


Weight 74.6 kg 84.2 kg 











General appearance: PRESENT: no acute distress, well-developed, well-nourished


Head exam: PRESENT: atraumatic, normocephalic


Eye exam: PRESENT: conjunctiva pink, EOMI, PERRLA


Ear exam: PRESENT: normal external ear exam


Mouth exam: PRESENT: moist, tongue midline


Neck exam: PRESENT: full ROM


Cardiovascular exam: PRESENT: RRR, +S1, +S2


Pulses: PRESENT: normal dorsalis pedis pul, +2 pedal pulses bilateral


Vascular exam: PRESENT: normal capillary refill


GI/Abdominal exam: PRESENT: normal bowel sounds, soft


Rectal exam: PRESENT: deferred


Neurological exam: PRESENT: alert, CN II-XII grossly intact


Psychiatric exam: PRESENT: appropriate affect, normal mood


Skin exam: PRESENT: dry, intact, warm





Results


Laboratory Results: 


                                        





                                 08/17/19 05:10 





                                 08/15/19 03:02 





                                        











  08/17/19





  05:10


 


WBC  6.3


 


RBC  3.98 L


 


Hgb  12.6 L


 


Hct  36.7 L


 


MCV  92


 


MCH  31.7


 


MCHC  34.3


 


RDW  15.8 H


 


Plt Count  207


 


Seg Neutrophils %  55.8


 


Lymphocytes %  26.0


 


Monocytes %  14.8 H


 


Eosinophils %  2.6


 


Basophils %  0.8


 


Absolute Neutrophils  3.5


 


Absolute Lymphocytes  1.6


 


Absolute Monocytes  0.9


 


Absolute Eosinophils  0.2


 


Absolute Basophils  0.0








                                        





08/14/19 23:05   Clean Catch Midstream   Urine Culture - Final


                            NO GROWTH 2 DAYS





                                        











  08/14/19 08/14/19 08/15/19





  20:35 20:35 03:02


 


Creatine Kinase  223 H   186 H


 


CK-MB (CK-2)   1.39 


 


Troponin I   < 0.012 


 


NT-Pro-B Natriuret Pep   262 














  08/15/19 08/15/19 08/15/19





  03:02 09:44 09:44


 


Creatine Kinase   152 


 


CK-MB (CK-2)  1.30   1.30


 


Troponin I  < 0.012   < 0.012


 


NT-Pro-B Natriuret Pep   











Impressions: 


                                        





Hip/Pelvis X-Ray  08/14/19 00:00


IMPRESSION:  NEGATIVE STUDY OF THE RIGHT HIP. NO RADIOGRAPHIC EVIDENCE OF ACUTE 

INJURY.


 








Abdomen/Pelvis CT  08/14/19 16:13


IMPRESSION:  Nondisplaced superior and inferior pubic rami fractures on the 

right.


Nondisplaced vertical coronal fractures extending into the acetabulum on the 

right.  Best seen on sagittal imaging.


Hyperdense mass at the base of the bladder on the left.  Tumor versus he

morrhage.


Gallstone.


3.4 cm infrarenal abdominal aortic aneurysm.


 














Qualifiers





- *


PATIENT BEING DISCHARGED WITH ANY OF THE FOLLOWING DIAGNOSIS: No


VTE patient discharged on overlapping Therapy?: No


Reason(s) for not prescribing Overlap Therapy:: Not indicated


Stroke Pt being discharged on Anti-thrombolytic therapy?: No


Reason(s) for not prescribing Anti-thrombolytic therapy:: Not indicated


Stroke Pt being discharged on Anti-coagulation therapy?: No


Reason(s) for not prescribing Anti-coagulation therapy:: Not indicated


Stroke Pt being discharged on Statins?: No


Reason(s) for not prescribing Statins therapy:: Not indicated


MI Pt being discharged on Aspirin therapy?: No


Reason(s) for not prescribing Aspirin therapy:: Not indicated


MI Pt being discharged on Statins?: No


Reason(s) for not prescribing Statin therapy:: Not indicated


MI Pt discharged ACEI/ARBS?: No


Reason(s) for not prescribing ACEI/ARBS:: Not indicated





Acute Heart Failure





- **


Is this a Heart Failure Patient?: No


e) For LVEF <35%, discharged on Aldosterone antagonist?: Yes

## 2020-04-17 ENCOUNTER — HOSPITAL ENCOUNTER (OUTPATIENT)
Dept: HOSPITAL 62 - RAD | Age: 70
End: 2020-04-17
Attending: INTERNAL MEDICINE
Payer: MEDICARE

## 2020-04-17 DIAGNOSIS — M25.571: Primary | ICD-10-CM

## 2020-04-17 NOTE — RADIOLOGY REPORT (SQ)
EXAM DESCRIPTION:  MRI RT LOWER JOINT WITHOUT



IMAGES COMPLETED DATE/TIME:  4/17/2020 9:48 am



REASON FOR STUDY:  M25.571 PAIN IN RIGHT ANKLE AND JOINTS OF RIGHT FOOT M25.571  PAIN IN RIGHT ANKLE 
AND JOINTS OF RIGHT FOOT



COMPARISON:  None.



TECHNIQUE:  Right ankle images acquired and stored on PACS. Multiplanar images include fat sensitive 
sequences as T1, fluid sensitive sequences as FST2/STIR, cartilage sensitive sequences as FSPD, and g
radient echo sequences.



LIMITATIONS:  None.



FINDINGS:  BONE MARROW: No fracture or bone lesion.  Mild dorsal talar beaking with edema as well.

EFFUSIONS: No subtalar or tibiotalar effusions. No loose bodies.

OSSEOUS ARTICULATIONS: No subluxation or dislocation.  Mild edema and subchondral cysts about the sub
talar articulations.

TALAR DOME AND TIBIAL PLAFOND: Normal cartilage. No osteochondral defect.

ACHILLES TENDON: Intact without partial or full-thickness tear.  No adjacent bursal fluid or edema.

TIBIALIS ANTERIOR TENDON: Intact without edema at the 1st MT attachment.

TIBIALIS POSTERIOR TENDON: Intact.  Insertion on an accessory navicular with slight arthro pathic fea
tures of narrowing and spurring and minimal edema.

FLEXOR HALLUCIS LONGUS AND FLEXOR DIGITORUM TENDONS: Normal morphology and no tendon sheath fluid. No
 edema of the os trigonum.

PERONEUS LONGUS AND BREVIS TENDON: Normal morphology and no tendon sheath fluid. No subluxation.

ATFL, CFL, PTFL: Intact. No thickening or signal alteration. No jet-ligamentous fluid.

DELTOID LIGAMENT: Visualized components intact.

TARSAL TUNNEL: No masses. No muscle atrophy.

SINUS TARSI: Generalized mild replacement of fat signal may reflect sinus tarsi syndrome.

PLANTAR FASCIA: No signal alteration or tear.

ADJACENT SOFT TISSUES: Generalized subcutaneous edema about the ankle.  Most notable anteriorly track
ing along the proximal foot dorsally.  No drainable fluid collections.

OTHER: No other significant finding.



IMPRESSION:

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

2. Accessory ossicle along posterior tibialis insertion with suggestion of potential pathologic featu
res of narrowing and spurring and edema.  Correlate with symptoms here.

3. Nonspecific soft tissue edema/cellulitis.

4. No fracture.  No effusion.  Other findings as above.



TECHNICAL DOCUMENTATION:  JOB ID: [id number]

 2011 [employer]- All Rights Reserved



Reading location - IP/workstation name: [email or web address]

## 2020-11-19 NOTE — PDOC H&P
History of Present Illness


Admission Date/PCP: 


  08/14/19 18:15





  VANDANA GARCIA MD





History of Present Illness: 


KALA VIERA is a 69 year old male,He came to the emergency room for evaluation

of hip pain after a fall at home,in  the emergency room a CAT scan of the 

abdomen and pelvis without contrast was obtained there was a well circumscribed 

low-density mass in the left kidney most likely cyst also found was a 4 cm mass 

at the base of the left bladder there was also a nondisplaced superior and 

inferior pubic rami fracture on the right pelvis with a nondisplaced fracture 

extending into the acetabulum, patient was offered hospital admission for 

management of his symptoms.  There was no antecedent loss of consciousness, no 

chest pain ,Subsequent CAT scan with contrast of the abdomen and pelvis was 

obtained it again demonstrated acute comminuted intra-articular fracture of the 

right acetabulum and  the right iliac wing, acute nondisplaced fracture of the 

right paracentral pubic symphysis and  the right inferior pubic ramus and also 

an enhancing solid mass that measure 3.9 cm in the posterior urinary bladder 

wall this is suspicious for neoplasm especially in this patient with History of 

smoking








Past Medical History


Pulmonary Medical History: Reports: Chronic Obstructive Pulmonary Disease (COPD)





Social History


Lives with: Spouse/Significant other - Wife


Smoking Status: Former Smoker


Frequency of Alcohol Use: Rare


Hx Recreational Drug Use: No


Drugs: None


Hx Prescription Drug Abuse: No





- Advance Directive


Resuscitation Status: Full Code





Family History


Family History: None, Reviewed & Not Pertinent


Parental Family History Reviewed: Yes


Children Family History Reviewed: Yes


Sibling(s) Family History Reviewed.: Yes





Medication/Allergy


Home Medications: 








Albuterol Sulfate [Ventolin 0.083% Neb 2.5 mg/3 mL Ampul] 3 ml NEB Q4HP PRN 

08/14/19 


Fluticasone/Umeclidin/Vilanter [Trelegy 100-62.5-25 Mcg Ellipta 14 Dose/Dpi] 1 

puff IH DAILY 08/14/19 


Tamsulosin HCl [Flomax 0.4 mg Cap.sr] 0.4 mg PO DAILY 08/14/19 








Allergies/Adverse Reactions: 


                                        





No Known Allergies Allergy (Verified 08/14/19 13:15)


   











Review of Systems


Constitutional: ABSENT: chills, fever(s), headache(s), weight gain, weight loss


Eyes: ABSENT: visual disturbances


Ears: ABSENT: hearing changes


Cardiovascular: ABSENT: chest pain, dyspnea on exertion, edema, orthropnea, 

palpitations


Respiratory: ABSENT: cough, hemoptysis


Gastrointestinal: ABSENT: abdominal pain, constipation, diarrhea, hematemesis, 

hematochezia, nausea, vomiting


Genitourinary: ABSENT: dysuria, hematuria


Musculoskeletal: PRESENT: back pain


Integumentary: ABSENT: rash, wounds


Neurological: ABSENT: abnormal gait, abnormal speech, confusion, dizziness, 

focal weakness, syncope


Psychiatric: ABSENT: anxiety, depression, homidical ideation, suicidal ideation


Endocrine: ABSENT: cold intolerance, heat intolerance, menstrual abnormalities, 

polydipsia, polyuria


Hematologic/Lymphatic: ABSENT: easy bleeding, easy bruising, lymphadenopathy





Physical Exam


Vital Signs: 


                                        











Temp Pulse Resp BP Pulse Ox


 


 98.1 F   78   16   129/75 H  97 


 


 08/15/19 15:14  08/15/19 15:14  08/15/19 15:14  08/15/19 15:14  08/15/19 15:14








                                 Intake & Output











 08/14/19 08/15/19 08/16/19





 06:59 06:59 06:59


 


Intake Total  1850 1200


 


Output Total  1675 1150


 


Balance  175 50


 


Weight  86.6 kg 











General appearance: PRESENT: no acute distress


Head exam: PRESENT: atraumatic, normocephalic


Eye exam: PRESENT: PERRLA


Neck exam: PRESENT: full ROM


Respiratory exam: PRESENT: clear to auscultation sylvester


Cardiovascular exam: PRESENT: RRR, +S1, +S2


GI/Abdominal exam: PRESENT: normal bowel sounds, soft


Rectal exam: PRESENT: deferred


Neurological exam: PRESENT: alert, CN II-XII grossly intact


Psychiatric exam: PRESENT: appropriate affect, normal mood


Skin exam: PRESENT: dry, intact, warm





Results


Laboratory Results: 


                                        





                                 08/15/19 03:02 





                                 08/15/19 03:02 





                                        











  08/14/19 08/14/19 08/14/19





  20:35 20:35 20:35


 


WBC  9.9  


 


RBC  4.15 L  


 


Hgb  12.9 L  


 


Hct  38.1  


 


MCV  92  


 


MCH  31.1  


 


MCHC  33.9  


 


RDW  15.5 H  


 


Plt Count  225  


 


Seg Neutrophils %  82.2 H  


 


Lymphocytes %  9.2 L  


 


Monocytes %  7.8  


 


Eosinophils %  0.1  


 


Basophils %  0.7  


 


Absolute Neutrophils  8.2  


 


Absolute Lymphocytes  0.9  


 


Absolute Monocytes  0.8  


 


Absolute Eosinophils  0.0  


 


Absolute Basophils  0.1  


 


Sodium   135.2 L 


 


Potassium   4.4 


 


Chloride   100 


 


Carbon Dioxide   25 


 


Anion Gap   10 


 


BUN   14 


 


Creatinine   0.81 


 


Est GFR ( Amer)   > 60 


 


Est GFR (Non-Af Amer)   > 60 


 


Glucose   252 H 


 


Calcium   8.8 


 


Phosphorus   4.0 


 


Magnesium   1.7 


 


Total Bilirubin   0.7 


 


AST   30 


 


Alkaline Phosphatase   75 


 


Ammonia   


 


Total Protein   6.5 


 


Albumin   4.0 


 


Amylase   53 


 


Lipase   24.3 


 


TSH    0.62


 


Free T4    0.88


 


Urine Color   


 


Urine Appearance   


 


Urine pH   


 


Ur Specific Gravity   


 


Urine Protein   


 


Urine Glucose (UA)   


 


Urine Ketones   


 


Urine Blood   


 


Urine Nitrite   


 


Ur Leukocyte Esterase   


 


Urine WBC (Auto)   


 


Urine RBC (Auto)   














  08/14/19 08/14/19 08/15/19





  20:35 23:05 03:02


 


WBC    7.8


 


RBC    3.87 L


 


Hgb    12.2 L


 


Hct    35.6 L


 


MCV    92


 


MCH    31.5


 


MCHC    34.3


 


RDW    15.8 H


 


Plt Count    207


 


Seg Neutrophils %    72.3


 


Lymphocytes %    15.1


 


Monocytes %    11.3


 


Eosinophils %    0.6


 


Basophils %    0.7


 


Absolute Neutrophils    5.7


 


Absolute Lymphocytes    1.2


 


Absolute Monocytes    0.9


 


Absolute Eosinophils    0.0


 


Absolute Basophils    0.1


 


Sodium   


 


Potassium   


 


Chloride   


 


Carbon Dioxide   


 


Anion Gap   


 


BUN   


 


Creatinine   


 


Est GFR ( Amer)   


 


Est GFR (Non-Af Amer)   


 


Glucose   


 


Calcium   


 


Phosphorus   


 


Magnesium   


 


Total Bilirubin   


 


AST   


 


Alkaline Phosphatase   


 


Ammonia  < 8.7 L  


 


Total Protein   


 


Albumin   


 


Amylase   


 


Lipase   


 


TSH   


 


Free T4   


 


Urine Color   YELLOW 


 


Urine Appearance   CLEAR 


 


Urine pH   7.0 


 


Ur Specific [place]   1.011 


 


Urine Protein   NEGATIVE 


 


Urine Glucose (UA)   150 H 


 


Urine Ketones   NEGATIVE 


 


Urine Blood   NEGATIVE 


 


Urine Nitrite   NEGATIVE 


 


Ur Leukocyte Esterase   NEGATIVE 


 


Urine WBC (Auto)   2 


 


Urine RBC (Auto)   0 














  08/15/19





  03:02


 


WBC 


 


RBC 


 


Hgb 


 


Hct 


 


MCV 


 


MCH 


 


MCHC 


 


RDW 


 


Plt Count 


 


Seg Neutrophils % 


 


Lymphocytes % 


 


Monocytes % 


 


Eosinophils % 


 


Basophils % 


 


Absolute Neutrophils 


 


Absolute Lymphocytes 


 


Absolute Monocytes 


 


Absolute Eosinophils 


 


Absolute Basophils 


 


Sodium  134.5 L


 


Potassium  4.6


 


Chloride  100


 


Carbon Dioxide  29


 


Anion Gap  6


 


BUN  13


 


Creatinine  0.77


 


Est GFR ( Amer)  > 60


 


Est GFR (Non-Af Amer)  > 60


 


Glucose  193 H


 


Calcium  8.8


 


Phosphorus 


 


Magnesium 


 


Total Bilirubin  0.7


 


AST  22


 


Alkaline Phosphatase  66


 


Ammonia 


 


Total Protein  5.9 L


 


Albumin  3.5


 


Amylase 


 


Lipase 


 


TSH 


 


Free T4 


 


Urine Color 


 


Urine Appearance 


 


Urine pH 


 


Ur Specific Gravity 


 


Urine Protein 


 


Urine Glucose (UA) 


 


Urine Ketones 


 


Urine Blood 


 


Urine Nitrite 


 


Ur Leukocyte Esterase 


 


Urine WBC (Auto) 


 


Urine RBC (Auto) 








                                        











  08/14/19 08/14/19 08/15/19





  20:35 20:35 03:02


 


Creatine Kinase  223 H   186 H


 


CK-MB (CK-2)   1.39 


 


Troponin I   < 0.012 


 


NT-Pro-B Natriuret Pep   262 














  08/15/19 08/15/19 08/15/19





  03:02 09:44 09:44


 


Creatine Kinase   152 


 


CK-MB (CK-2)  1.30   1.30


 


Troponin I  < 0.012   < 0.012


 


NT-Pro-B Natriuret Pep   











Impressions: 


                                        





Hip/Pelvis X-Ray  08/14/19 00:00


IMPRESSION:  NEGATIVE STUDY OF THE RIGHT HIP. NO RADIOGRAPHIC EVIDENCE OF ACUTE 

INJURY.


 








Abdomen/Pelvis CT  08/14/19 16:13


IMPRESSION:  Nondisplaced superior and inferior pubic rami fractures on the 

right.


Nondisplaced vertical coronal fractures extending into the acetabulum on the 

right.  Best seen on sagittal imaging.


Hyperdense mass at the base of the bladder on the left.  Tumor versus hemo

rrhage.


Gallstone.


3.4 cm infrarenal abdominal aortic aneurysm.


 














Assessment & Plan





- Diagnosis


(1) Acetabulum fracture, right


Qualifiers: 


   Encounter type: initial encounter   Sublocation of acetabulum: dome   

Fracture type: closed   Fracture alignment: nondisplaced   Qualified Code(s): 

S32.484A - Nondisplaced dome fracture of right acetabulum, initial encounter for

closed fracture   


Is this a current diagnosis for this admission?: Yes   


Plan: 


There is no surgical operative indication for this fracture consultation will be

obtained from orthopedic








(2) Pubic ramus fracture


Qualifiers: 


   Encounter type: initial encounter   Fracture type: closed   Laterality: right

  Qualified Code(s): S32.591A - Other specified fracture of right pubis, initial

encounter for closed fracture   


Is this a current diagnosis for this admission?: Yes   





(3) Abdominal aortic aneurysm


Qualifiers: 


   Presence of rupture: without rupture   Qualified Code(s): I71.4 - Abdominal 

aortic aneurysm, without rupture   


Is this a current diagnosis for this admission?: Yes   





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


Is this a current diagnosis for this admission?: Yes   


Plan: 


Patient is aware of the neoplasm in the bladder wall, is scheduled to follow 

outpatient with urology for cystoscopy in September according to the patient.
Detail Level: Simple
Detail Level: Detailed